# Patient Record
Sex: MALE | Race: WHITE | Employment: OTHER | URBAN - NONMETROPOLITAN AREA
[De-identification: names, ages, dates, MRNs, and addresses within clinical notes are randomized per-mention and may not be internally consistent; named-entity substitution may affect disease eponyms.]

---

## 2018-05-30 ENCOUNTER — TELEPHONE (OUTPATIENT)
Dept: AUDIOLOGY | Age: 73
End: 2018-05-30

## 2018-06-05 ENCOUNTER — TELEPHONE (OUTPATIENT)
Dept: AUDIOLOGY | Age: 73
End: 2018-06-05

## 2018-06-05 ENCOUNTER — HOSPITAL ENCOUNTER (OUTPATIENT)
Dept: AUDIOLOGY | Age: 73
Discharge: HOME | End: 2018-06-05
Payer: MEDICARE

## 2018-06-05 PROCEDURE — V5014 HEARING AID REPAIR/MODIFYING: HCPCS | Performed by: AUDIOLOGIST

## 2018-09-06 ENCOUNTER — TELEPHONE (OUTPATIENT)
Dept: AUDIOLOGY | Age: 73
End: 2018-09-06

## 2018-09-06 NOTE — TELEPHONE ENCOUNTER
Pt stopped by audiology department- the right hearing aid is not working. A listening check confirmed. The hearing aid microphone and  were clean. Sent in for repair (in warranty). Will call the patient at [phone] when the hearing aid comes in.

## 2018-09-12 ENCOUNTER — HOSPITAL ENCOUNTER (INPATIENT)
Age: 73
LOS: 3 days | Discharge: HOME | DRG: 378 | End: 2018-09-15
Attending: HOSPITALIST | Admitting: HOSPITALIST
Payer: MEDICARE

## 2018-09-12 ENCOUNTER — APPOINTMENT (OUTPATIENT)
Dept: CT IMAGING | Age: 73
DRG: 378 | End: 2018-09-12
Payer: MEDICARE

## 2018-09-12 DIAGNOSIS — K92.2 LOWER GI BLEED: Primary | ICD-10-CM

## 2018-09-12 DIAGNOSIS — K57.91 GASTROINTESTINAL HEMORRHAGE ASSOCIATED WITH INTESTINAL DIVERTICULOSIS: ICD-10-CM

## 2018-09-12 LAB
ABO: NORMAL
ALBUMIN SERPL-MCNC: 4.2 G/DL (ref 3.5–5.1)
ALP BLD-CCNC: 82 U/L (ref 38–126)
ALT SERPL-CCNC: 15 U/L (ref 11–66)
ANION GAP SERPL CALCULATED.3IONS-SCNC: 12 MEQ/L (ref 8–16)
ANTIBODY SCREEN: NORMAL
APTT: 27.9 SECONDS (ref 22–38)
AST SERPL-CCNC: 22 U/L (ref 5–40)
BACTERIA: ABNORMAL /HPF
BASOPHILS # BLD: 0.6 %
BASOPHILS ABSOLUTE: 0 THOU/MM3 (ref 0–0.1)
BILIRUB SERPL-MCNC: 0.9 MG/DL (ref 0.3–1.2)
BILIRUBIN DIRECT: < 0.2 MG/DL (ref 0–0.3)
BILIRUBIN URINE: ABNORMAL
BLOOD, URINE: NEGATIVE
BUN BLDV-MCNC: 18 MG/DL (ref 7–22)
CALCIUM SERPL-MCNC: 9.2 MG/DL (ref 8.5–10.5)
CASTS 2: ABNORMAL /LPF
CASTS UA: ABNORMAL /LPF
CHARACTER, URINE: CLEAR
CHLORIDE BLD-SCNC: 104 MEQ/L (ref 98–111)
CO2: 24 MEQ/L (ref 23–33)
COLOR: ABNORMAL
CREAT SERPL-MCNC: 1 MG/DL (ref 0.4–1.2)
CRYSTALS, UA: ABNORMAL
EOSINOPHIL # BLD: 1.8 %
EOSINOPHILS ABSOLUTE: 0.1 THOU/MM3 (ref 0–0.4)
EPITHELIAL CELLS, UA: ABNORMAL /HPF
ERYTHROCYTE [DISTWIDTH] IN BLOOD BY AUTOMATED COUNT: 16.3 % (ref 11.5–14.5)
ERYTHROCYTE [DISTWIDTH] IN BLOOD BY AUTOMATED COUNT: 53.7 FL (ref 35–45)
GFR SERPL CREATININE-BSD FRML MDRD: 73 ML/MIN/1.73M2
GLUCOSE BLD-MCNC: 100 MG/DL (ref 70–108)
GLUCOSE URINE: NEGATIVE MG/DL
HCT VFR BLD CALC: 40 % (ref 42–52)
HEMOCCULT STL QL: POSITIVE
HEMOGLOBIN: 12.9 GM/DL (ref 14–18)
ICTOTEST: NEGATIVE
IMMATURE GRANS (ABS): 0.02 THOU/MM3 (ref 0–0.07)
IMMATURE GRANULOCYTES: 0.3 %
INR BLD: 0.91 (ref 0.85–1.13)
KETONES, URINE: ABNORMAL
LEUKOCYTE ESTERASE, URINE: NEGATIVE
LIPASE: 18.5 U/L (ref 5.6–51.3)
LYMPHOCYTES # BLD: 29 %
LYMPHOCYTES ABSOLUTE: 1.8 THOU/MM3 (ref 1–4.8)
MCH RBC QN AUTO: 29.1 PG (ref 26–33)
MCHC RBC AUTO-ENTMCNC: 32.3 GM/DL (ref 32.2–35.5)
MCV RBC AUTO: 90.1 FL (ref 80–94)
MISCELLANEOUS 2: ABNORMAL
MONOCYTES # BLD: 9.7 %
MONOCYTES ABSOLUTE: 0.6 THOU/MM3 (ref 0.4–1.3)
NITRITE, URINE: NEGATIVE
NUCLEATED RED BLOOD CELLS: 0 /100 WBC
OSMOLALITY CALCULATION: 281.4 MOSMOL/KG (ref 275–300)
PH UA: 5
PLATELET # BLD: 233 THOU/MM3 (ref 130–400)
PMV BLD AUTO: 9.9 FL (ref 9.4–12.4)
POTASSIUM SERPL-SCNC: 4.5 MEQ/L (ref 3.5–5.2)
PROTEIN UA: NEGATIVE
RBC # BLD: 4.44 MILL/MM3 (ref 4.7–6.1)
RBC URINE: ABNORMAL /HPF
RENAL EPITHELIAL, UA: ABNORMAL
RH FACTOR: NORMAL
SEG NEUTROPHILS: 58.6 %
SEGMENTED NEUTROPHILS ABSOLUTE COUNT: 3.6 THOU/MM3 (ref 1.8–7.7)
SODIUM BLD-SCNC: 140 MEQ/L (ref 135–145)
SPECIFIC GRAVITY, URINE: 1.03 (ref 1–1.03)
TOTAL PROTEIN: 6.5 G/DL (ref 6.1–8)
UROBILINOGEN, URINE: 1 EU/DL
WBC # BLD: 6.2 THOU/MM3 (ref 4.8–10.8)
WBC UA: ABNORMAL /HPF
YEAST: ABNORMAL

## 2018-09-12 PROCEDURE — 99222 1ST HOSP IP/OBS MODERATE 55: CPT | Performed by: HOSPITALIST

## 2018-09-12 PROCEDURE — 80053 COMPREHEN METABOLIC PANEL: CPT

## 2018-09-12 PROCEDURE — 2709999900 HC NON-CHARGEABLE SUPPLY

## 2018-09-12 PROCEDURE — 1200000000 HC SEMI PRIVATE

## 2018-09-12 PROCEDURE — 82272 OCCULT BLD FECES 1-3 TESTS: CPT

## 2018-09-12 PROCEDURE — 74177 CT ABD & PELVIS W/CONTRAST: CPT

## 2018-09-12 PROCEDURE — 81001 URINALYSIS AUTO W/SCOPE: CPT

## 2018-09-12 PROCEDURE — 6360000002 HC RX W HCPCS: Performed by: PHYSICIAN ASSISTANT

## 2018-09-12 PROCEDURE — 86923 COMPATIBILITY TEST ELECTRIC: CPT

## 2018-09-12 PROCEDURE — 85025 COMPLETE CBC W/AUTO DIFF WBC: CPT

## 2018-09-12 PROCEDURE — 85730 THROMBOPLASTIN TIME PARTIAL: CPT

## 2018-09-12 PROCEDURE — 82248 BILIRUBIN DIRECT: CPT

## 2018-09-12 PROCEDURE — 2580000003 HC RX 258: Performed by: PHYSICIAN ASSISTANT

## 2018-09-12 PROCEDURE — C9113 INJ PANTOPRAZOLE SODIUM, VIA: HCPCS | Performed by: PHYSICIAN ASSISTANT

## 2018-09-12 PROCEDURE — 83690 ASSAY OF LIPASE: CPT

## 2018-09-12 PROCEDURE — 96365 THER/PROPH/DIAG IV INF INIT: CPT

## 2018-09-12 PROCEDURE — 2580000003 HC RX 258: Performed by: HOSPITALIST

## 2018-09-12 PROCEDURE — 36415 COLL VENOUS BLD VENIPUNCTURE: CPT

## 2018-09-12 PROCEDURE — 6360000004 HC RX CONTRAST MEDICATION: Performed by: PHYSICIAN ASSISTANT

## 2018-09-12 PROCEDURE — 86900 BLOOD TYPING SEROLOGIC ABO: CPT

## 2018-09-12 PROCEDURE — P9016 RBC LEUKOCYTES REDUCED: HCPCS

## 2018-09-12 PROCEDURE — 85610 PROTHROMBIN TIME: CPT

## 2018-09-12 PROCEDURE — 86850 RBC ANTIBODY SCREEN: CPT

## 2018-09-12 PROCEDURE — 99285 EMERGENCY DEPT VISIT HI MDM: CPT

## 2018-09-12 PROCEDURE — 86901 BLOOD TYPING SEROLOGIC RH(D): CPT

## 2018-09-12 RX ORDER — SODIUM CHLORIDE 9 MG/ML
INJECTION, SOLUTION INTRAVENOUS CONTINUOUS
Status: DISCONTINUED | OUTPATIENT
Start: 2018-09-12 | End: 2018-09-14

## 2018-09-12 RX ORDER — SODIUM CHLORIDE 0.9 % (FLUSH) 0.9 %
10 SYRINGE (ML) INJECTION PRN
Status: DISCONTINUED | OUTPATIENT
Start: 2018-09-12 | End: 2018-09-15 | Stop reason: HOSPADM

## 2018-09-12 RX ORDER — 0.9 % SODIUM CHLORIDE 0.9 %
1000 INTRAVENOUS SOLUTION INTRAVENOUS ONCE
Status: COMPLETED | OUTPATIENT
Start: 2018-09-12 | End: 2018-09-12

## 2018-09-12 RX ORDER — SODIUM CHLORIDE 0.9 % (FLUSH) 0.9 %
10 SYRINGE (ML) INJECTION EVERY 12 HOURS SCHEDULED
Status: DISCONTINUED | OUTPATIENT
Start: 2018-09-12 | End: 2018-09-15 | Stop reason: HOSPADM

## 2018-09-12 RX ORDER — ONDANSETRON 2 MG/ML
4 INJECTION INTRAMUSCULAR; INTRAVENOUS EVERY 6 HOURS PRN
Status: DISCONTINUED | OUTPATIENT
Start: 2018-09-12 | End: 2018-09-15 | Stop reason: HOSPADM

## 2018-09-12 RX ORDER — ACETAMINOPHEN 325 MG/1
650 TABLET ORAL EVERY 4 HOURS PRN
Status: DISCONTINUED | OUTPATIENT
Start: 2018-09-12 | End: 2018-09-15 | Stop reason: HOSPADM

## 2018-09-12 RX ADMIN — IOPAMIDOL 80 ML: 755 INJECTION, SOLUTION INTRAVENOUS at 17:02

## 2018-09-12 RX ADMIN — SODIUM CHLORIDE 1000 ML: 9 INJECTION, SOLUTION INTRAVENOUS at 16:20

## 2018-09-12 RX ADMIN — SODIUM CHLORIDE: 9 INJECTION, SOLUTION INTRAVENOUS at 20:45

## 2018-09-12 RX ADMIN — SODIUM CHLORIDE 80 MG: 9 INJECTION, SOLUTION INTRAVENOUS at 17:36

## 2018-09-12 RX ADMIN — SODIUM CHLORIDE 8 MG/HR: 9 INJECTION, SOLUTION INTRAVENOUS at 17:57

## 2018-09-12 ASSESSMENT — ENCOUNTER SYMPTOMS
SORE THROAT: 0
EYE REDNESS: 0
RHINORRHEA: 0
CONSTIPATION: 0
BLOOD IN STOOL: 1
ABDOMINAL PAIN: 0
DIARRHEA: 0
NAUSEA: 0
EYE DISCHARGE: 0
VOMITING: 0
BACK PAIN: 0
WHEEZING: 0
COUGH: 0
SHORTNESS OF BREATH: 0
COLOR CHANGE: 0

## 2018-09-12 ASSESSMENT — PAIN SCALES - GENERAL: PAINLEVEL_OUTOF10: 0

## 2018-09-12 NOTE — H&P
input(s): Lucretia Hermes in the last 72 hours. Urinalysis:      Lab Results   Component Value Date    NITRU NEGATIVE 09/12/2018    WBCUA 0-2 09/12/2018    BACTERIA NONE 09/12/2018    RBCUA 0-2 09/12/2018    BLOODU NEGATIVE 09/12/2018    GLUCOSEU NEGATIVE 09/12/2018       Intake & Output:  No intake/output data recorded. No intake/output data recorded. Radiology:   CT ABDOMEN PELVIS W IV CONTRAST Additional Contrast? None   Final Result      1. Moderate-sized hiatal hernia. 2. Distended gallbladder with possible gallbladder fold or stone in the gallbladder neck. Correlation with serology and ultrasound as clinically indicated. 3. Moderate scattered stool throughout the colon multiple colonic diverticuli greatest sigmoid colon. There is no wall thickening to suggest diverticulitis. 4. Advanced degenerative changes lumbar spine please see above additional comments         **This report has been created using voice recognition software. It may contain minor errors which are inherent in voice recognition technology. **      Final report electronically signed by Dr. Mariann Andino on 9/12/2018 5:18 PM               DVT prophylaxis: [] Lovenox                                 [x] SCDs                                 [] SQ Heparin                                 [] Encourage ambulation           [] Already on Anticoagulation    Code Status: Prior        Disposition:    [x] Home       [] TCU       [] Rehab       [] Psych       [] SNF       [] [facility]       [] Other-    ASSESSMENT:    C/Lucila Tijerina 110[address] Problems    Diagnosis Date Noted    GIB (gastrointestinal bleeding) [K92.2] 09/12/2018       PLAN:    1. GIB  -Will keep NPO, IVF's with NS at 150 mL/hr  -PPI gtt  -CBC in am  -GI consult for further evaluation          Thank you Tonia Rose MD for the opportunity to be involved in this patient's care.     Electronically signed by Sarai Smith MD on 9/12/2018 at 6:48 PM

## 2018-09-12 NOTE — ED PROVIDER NOTES
[facility] EMERGENCY DEPT      CHIEF COMPLAINT       Chief Complaint   Patient presents with    Rectal Bleeding       Nurses Notes reviewed and I agree except as noted in the HPI. HISTORY OF PRESENT ILLNESS    Maricarmen Figueroa is a 74 y.o. male who presents to the emergency department for evaluation of rectal bleeding. The patient reports a history of diverticulitis, lower GI bleeding, and acute blood loss anemia. He states that he does not regularly follow with the gastroenterologist.  The patient states that he noticed blood in his stool today at 1300 and 1400. He denies any associated abdominal pain, nausea, vomiting, diarrhea, or constipation. He reports mild lightheadedness but denies any headaches or dizziness. He denies any chest pain, shortness of breath, fevers, or chills. There are no additional complaints at this time. REVIEW OF SYSTEMS     Review of Systems   Constitutional: Negative for chills, fatigue and fever. HENT: Negative for congestion, ear pain, rhinorrhea and sore throat. Eyes: Negative for discharge and redness. Respiratory: Negative for cough, shortness of breath and wheezing. Cardiovascular: Negative for chest pain and palpitations. Gastrointestinal: Positive for blood in stool. Negative for abdominal pain, constipation, diarrhea, nausea and vomiting. Genitourinary: Negative for decreased urine volume, difficulty urinating and dysuria. Musculoskeletal: Negative for arthralgias, back pain, myalgias, neck pain and neck stiffness. Skin: Negative for color change, pallor and rash. Neurological: Positive for light-headedness. Negative for dizziness, syncope, weakness, numbness and headaches. Hematological: Negative for adenopathy. Psychiatric/Behavioral: Negative for agitation, confusion, dysphoric mood and suicidal ideas. The patient is not nervous/anxious. PAST MEDICAL HISTORY    has a past medical history of Arthritis.     SURGICAL HISTORY      has a past surgical history that includes hernia repair (X3). CURRENT MEDICATIONS       Previous Medications    ACETAMINOPHEN (TYLENOL) 500 MG TABLET    Take 1,000 mg by mouth every 6 hours as needed for Pain    AMLODIPINE (NORVASC) 10 MG TABLET    Take 1 tablet by mouth daily    FERROUS SULFATE 325 (65 FE) MG TABLET    Take 1 tablet by mouth 2 times daily (with meals)    MISC NATURAL PRODUCTS (GLUCOS-CHONDROIT-MSM COMPLEX) TABS    Take 1 tablet by mouth daily    MULTIPLE VITAMINS-MINERALS (THERAPEUTIC MULTIVITAMIN-MINERALS) TABLET    Take 1 tablet by mouth daily       ALLERGIES     is allergic to sulfa antibiotics. FAMILY HISTORY     indicated that his mother is . He indicated that his father is . He indicated that his sister is alive. family history includes Arthritis in his mother and sister; Cancer in his father; High Blood Pressure in his sister. SOCIAL HISTORY      reports that he has been smoking. He has a 7.50 pack-year smoking history. He uses smokeless tobacco. He reports that he does not drink alcohol or use drugs. PHYSICAL EXAM     INITIAL VITALS:  height is 5' 6\" (1.676 m) and weight is 170 lb (77.1 kg). His oral temperature is 98 °F (36.7 °C). His blood pressure is 134/75 and his pulse is 85. His respiration is 16 and oxygen saturation is 95%. Physical Exam   Constitutional: He is oriented to person, place, and time. He appears well-developed and well-nourished. No distress. HENT:   Head: Normocephalic and atraumatic. Right Ear: External ear normal.   Left Ear: External ear normal.   Nose: Nose normal.   Mouth/Throat: Oropharynx is clear and moist.   Eyes: Pupils are equal, round, and reactive to light. Conjunctivae and EOM are normal. Right eye exhibits no discharge. Left eye exhibits no discharge. No scleral icterus. Neck: Normal range of motion. Neck supple. Cardiovascular: Normal rate, regular rhythm and normal heart sounds.   Exam reveals no gallop and no

## 2018-09-12 NOTE — ED NOTES
Pt resting in bed with side rails up 2x2 and call light in reach. Vital signs stable. IV site checked. Orthostatic vitals signs taken. See vitals note.        Tiffanie Mohan RN  09/12/18 3225

## 2018-09-13 ENCOUNTER — APPOINTMENT (OUTPATIENT)
Dept: NUCLEAR MEDICINE | Age: 73
DRG: 378 | End: 2018-09-13
Payer: MEDICARE

## 2018-09-13 LAB
ANION GAP SERPL CALCULATED.3IONS-SCNC: 10 MEQ/L (ref 8–16)
BUN BLDV-MCNC: 15 MG/DL (ref 7–22)
CALCIUM SERPL-MCNC: 7.9 MG/DL (ref 8.5–10.5)
CHLORIDE BLD-SCNC: 109 MEQ/L (ref 98–111)
CO2: 23 MEQ/L (ref 23–33)
CREAT SERPL-MCNC: 0.7 MG/DL (ref 0.4–1.2)
ERYTHROCYTE [DISTWIDTH] IN BLOOD BY AUTOMATED COUNT: 16.3 % (ref 11.5–14.5)
ERYTHROCYTE [DISTWIDTH] IN BLOOD BY AUTOMATED COUNT: 54.7 FL (ref 35–45)
GFR SERPL CREATININE-BSD FRML MDRD: > 90 ML/MIN/1.73M2
GLUCOSE BLD-MCNC: 93 MG/DL (ref 70–108)
HCT VFR BLD CALC: 27.6 % (ref 42–52)
HCT VFR BLD CALC: 28.4 % (ref 42–52)
HCT VFR BLD CALC: 30.8 % (ref 42–52)
HCT VFR BLD CALC: 31.1 % (ref 42–52)
HCT VFR BLD CALC: 33.5 % (ref 42–52)
HEMOGLOBIN: 10.3 GM/DL (ref 14–18)
HEMOGLOBIN: 10.3 GM/DL (ref 14–18)
HEMOGLOBIN: 10.7 GM/DL (ref 14–18)
HEMOGLOBIN: 8.9 GM/DL (ref 14–18)
HEMOGLOBIN: 9.5 GM/DL (ref 14–18)
MCH RBC QN AUTO: 29.6 PG (ref 26–33)
MCHC RBC AUTO-ENTMCNC: 32.2 GM/DL (ref 32.2–35.5)
MCV RBC AUTO: 91.7 FL (ref 80–94)
MRSA SCREEN RT-PCR: NEGATIVE
PLATELET # BLD: 176 THOU/MM3 (ref 130–400)
PMV BLD AUTO: 10 FL (ref 9.4–12.4)
POTASSIUM REFLEX MAGNESIUM: 4.5 MEQ/L (ref 3.5–5.2)
RBC # BLD: 3.01 MILL/MM3 (ref 4.7–6.1)
SODIUM BLD-SCNC: 142 MEQ/L (ref 135–145)
WBC # BLD: 4.4 THOU/MM3 (ref 4.8–10.8)

## 2018-09-13 PROCEDURE — 2709999900 HC NON-CHARGEABLE SUPPLY

## 2018-09-13 PROCEDURE — 85014 HEMATOCRIT: CPT

## 2018-09-13 PROCEDURE — 36415 COLL VENOUS BLD VENIPUNCTURE: CPT

## 2018-09-13 PROCEDURE — 2580000003 HC RX 258: Performed by: HOSPITALIST

## 2018-09-13 PROCEDURE — 80048 BASIC METABOLIC PNL TOTAL CA: CPT

## 2018-09-13 PROCEDURE — 99233 SBSQ HOSP IP/OBS HIGH 50: CPT | Performed by: INTERNAL MEDICINE

## 2018-09-13 PROCEDURE — 2580000003 HC RX 258: Performed by: NURSE PRACTITIONER

## 2018-09-13 PROCEDURE — 85018 HEMOGLOBIN: CPT

## 2018-09-13 PROCEDURE — 6360000002 HC RX W HCPCS: Performed by: HOSPITALIST

## 2018-09-13 PROCEDURE — A9560 TC99M LABELED RBC: HCPCS | Performed by: NURSE PRACTITIONER

## 2018-09-13 PROCEDURE — 87147 CULTURE TYPE IMMUNOLOGIC: CPT

## 2018-09-13 PROCEDURE — 87641 MR-STAPH DNA AMP PROBE: CPT

## 2018-09-13 PROCEDURE — 36430 TRANSFUSION BLD/BLD COMPNT: CPT

## 2018-09-13 PROCEDURE — 85027 COMPLETE CBC AUTOMATED: CPT

## 2018-09-13 PROCEDURE — 2060000000 HC ICU INTERMEDIATE R&B

## 2018-09-13 PROCEDURE — 3430000000 HC RX DIAGNOSTIC RADIOPHARMACEUTICAL: Performed by: NURSE PRACTITIONER

## 2018-09-13 PROCEDURE — 87081 CULTURE SCREEN ONLY: CPT

## 2018-09-13 PROCEDURE — P9016 RBC LEUKOCYTES REDUCED: HCPCS

## 2018-09-13 PROCEDURE — 78278 ACUTE GI BLOOD LOSS IMAGING: CPT

## 2018-09-13 PROCEDURE — C9113 INJ PANTOPRAZOLE SODIUM, VIA: HCPCS | Performed by: HOSPITALIST

## 2018-09-13 RX ORDER — 0.9 % SODIUM CHLORIDE 0.9 %
250 INTRAVENOUS SOLUTION INTRAVENOUS ONCE
Status: DISCONTINUED | OUTPATIENT
Start: 2018-09-13 | End: 2018-09-15 | Stop reason: HOSPADM

## 2018-09-13 RX ORDER — 0.9 % SODIUM CHLORIDE 0.9 %
250 INTRAVENOUS SOLUTION INTRAVENOUS ONCE
Status: COMPLETED | OUTPATIENT
Start: 2018-09-13 | End: 2018-09-14

## 2018-09-13 RX ADMIN — SODIUM CHLORIDE: 9 INJECTION, SOLUTION INTRAVENOUS at 14:10

## 2018-09-13 RX ADMIN — SODIUM CHLORIDE: 9 INJECTION, SOLUTION INTRAVENOUS at 20:07

## 2018-09-13 RX ADMIN — SODIUM CHLORIDE 8 MG/HR: 9 INJECTION, SOLUTION INTRAVENOUS at 20:05

## 2018-09-13 RX ADMIN — SODIUM CHLORIDE: 9 INJECTION, SOLUTION INTRAVENOUS at 04:45

## 2018-09-13 RX ADMIN — SODIUM CHLORIDE 250 ML: 9 INJECTION, SOLUTION INTRAVENOUS at 11:57

## 2018-09-13 RX ADMIN — SODIUM CHLORIDE 250 ML: 9 INJECTION, SOLUTION INTRAVENOUS at 11:14

## 2018-09-13 RX ADMIN — Medication 30.1 MILLICURIE: at 08:06

## 2018-09-13 RX ADMIN — Medication 10 ML: at 14:00

## 2018-09-13 ASSESSMENT — PAIN SCALES - GENERAL
PAINLEVEL_OUTOF10: 0
PAINLEVEL_OUTOF10: 0

## 2018-09-13 NOTE — PLAN OF CARE
Problem: GI  Goal: No bowel complications  Outcome: Ongoing  Patient having LARGE bloody bowel movements, MDs aware  Goal: Bowel movement at least every other day  Outcome: Completed Date Met: 09/13/18      Problem: Nutrition  Goal: Optimal nutrition therapy  Outcome: Ongoing  NPO    Problem: DISCHARGE BARRIERS  Goal: Patient's continuum of care needs are met  Outcome: Ongoing  Home with wife    Problem: Discharge Planning:  Goal: Discharged to appropriate level of care  Discharged to appropriate level of care  Outcome: Ongoing  Home with wife    Problem: Bowel Function - Altered:  Goal: Bowel elimination is within specified parameters  Bowel elimination is within specified parameters  Outcome: Ongoing  Patient having LARGE bloody bowel movements, MDs aware  Gave 3 units of PRBC    Problem: Fluid Volume - Imbalance:  Goal: Will show no signs and symptoms of excessive bleeding  Will show no signs and symptoms of excessive bleeding  Outcome: Ongoing  Having large bloody bowel movements   Goal: Absence of imbalanced fluid volume signs and symptoms  Absence of imbalanced fluid volume signs and symptoms  Outcome: Ongoing  Monitoring inputs and outputs    Problem: Nausea/Vomiting:  Goal: Absence of nausea/vomiting  Absence of nausea/vomiting  Outcome: Ongoing  No nausea or vomiting    Comments: Care plan reviewed with patient and family. Patient and family verbalize understanding of the plan of care and contribute to goal setting.

## 2018-09-13 NOTE — PROGRESS NOTES
1015: Pt arrived in 4k 19 in a wheelchair. Complaints: None. IV normal saline infusing into the antecubital left, condition patent and no redness at a rate of 150 mls/ hour with about 500 mls remaining in the bag. 1015: Patient had one large loose bright red bowel movement    1030: RN updated Sarah Nurse Practitioner, of bleeding scan results a     1100: Sarah nurse practitioner, on floor, ordered RN to give 2 units of PRBC at 999 mls/hour and then ok to give 3rd unit of PRBC slower and to check hgb 1 hour after blood is finished    1145: Called NATALIE Smith, to let her know patient had another large loose red bowel movement. NP ordered okay to give 3rd unit of blood over 999 mls/hour. 1230: Lungs remain clear, patient alert and oriented, vitals okay.

## 2018-09-13 NOTE — CARE COORDINATION
18, 1:03 PM      Hui Rivera       Admitted from: ED to [facility] prior to [facility] 2018/ [address] day:    Location: [facility] Reason for admit: GIB (gastrointestinal bleeding) [K92.2] Status: IP  Admit order signed?: yes  PMH:  has a past medical history of Arthritis; History of blood transfusion; and Hypertension. Procedure: none  Pertinent abnormal Imagin/13 Nuclear Medicine GI Scan: Active GI Bleeding  Medications:  Scheduled Meds:   sodium chloride  250 mL Intravenous Once    sodium chloride  250 mL Intravenous Once    sodium chloride  250 mL Intravenous Once    sodium chloride flush  10 mL Intravenous 2 times per day     Continuous Infusions:   sodium chloride 150 mL/hr at 18 0445    pantoprozole (PROTONIX) infusion        Pertinent Info/Orders/Treatment Plan:  PPI Gtt continued, Blood Transfusion orders on chart, GI following, H 8.9; monitor  Diet: Diet NPO Effective Now Exceptions are: Ice Chips   DVT Prophylaxis: SCD/s ordered  Smoking status:  reports that he has quit smoking. He has a 7.50 pack-year smoking history.  He has quit using smokeless tobacco.   Influenza Vaccination Screening Completed: n/a  Pneumonia Vaccination Screening Completed: no, just admitted, await nsg screen  PCP: Jojo Wheeler MD  Readmission: none  Readmission Risk Score: 6%    Discharge Planning  Current Residence:  Private Residence  Living Arrangements:  Spouse/Significant Other   Support Systems:  Spouse/Significant Other  Current Services PTA:     Potential Assistance Needed:  N/A  Potential Assistance Purchasing Medications:  No  Does patient want to participate in local refill/ meds to beds program?  N/A  Type of Home Care Services:  None  Patient expects to be discharged to:  Home  Expected Discharge date:  09/15/18  Follow Up Appointment: Best Day/ Time:      Discharge Plan: plans home with spouse     Evaluation: no

## 2018-09-13 NOTE — CONSULTS
Consult History & Physical      Patient:  Hui Rivera  YOB: 1945    MRN: [id number]     Acct: [de-identified]      Chief Complaint:  Rectal bleeding    Date of Service: Pt seen/examined in consultation on 9/13/2018    History Of Present Illness:      74 y.o. male who we are asked to see/evaluate by Shey Romo MD for medical management of Rectal bleeding  Hui Rivera is a 74 y.o. male who has underlying history of suspected diverticular bleed in 2016, severe diverticulosis of the sigmoid colon, internal hemorrhoids, hypertension presents to the emergency department for evaluation of rectal bleeding. [address] [facility] reports onset of bright red blood noted 9/12/2018 around 1 PM.  He reports he had 3-4 large bloody loose stools. Prior to bowel movement he had crampy abdominal type pain which was improved after bowel movement. He denies any fever or chills. He states due to prior history of GI bleed who presented to ER. He states prior to this occasionally he would note scant amounts of blood on the tissue that occurred with a bowel movement however this was not routinely. He denies any constipation or diarrhea. He reports his stools are usually a soft brown color. In regards to upper GI he denies indigestion or heartburn type symptoms. Occasionally he will have description of bloating after eating however this is not routine. He denies any odynophagia, dysphagia, epigastric pain, hematemesis or melena. Patient reports he is had 3 bloody stools overnight. His last stool was approximately 20 minutes ago. He describes this as a moderate to large amount. Initially he thought bleeding was slowing down however, now he feels it is about the same. Rectal exam reveals bright red blood in rectal vault. He also has internal hemorrhoids. He states he takes 2 Aleve every day for arthritic pain.     He reports he was admitted to the hospital a couple years ago and underwent a colonoscopy due to amLODIPine (NORVASC) 10 MG tablet Take 1 tablet by mouth daily 8/22/16  Yes Dannie Castillo MD   Multiple Vitamins-Minerals (THERAPEUTIC MULTIVITAMIN-MINERALS) tablet Take 1 tablet by mouth daily   Yes Historical Provider, MD   acetaminophen (TYLENOL) 500 MG tablet Take 1,000 mg by mouth every 6 hours as needed for Pain   Yes Historical Provider, MD   ferrous sulfate 325 (65 FE) MG tablet Take 1 tablet by mouth 2 times daily (with meals) 8/22/16   Dannie Castillo MD   Misc Natural Products (GLUCOS-CHONDROIT-MSM COMPLEX) TABS Take 1 tablet by mouth daily    Historical Provider, MD       Allergies:  Sulfa antibiotics    Social History:      The patient currently lives     TOBACCO:   reports that he has quit smoking. He has a 7.50 pack-year smoking history. He has quit using smokeless tobacco.  ETOH:   reports that he drinks about 14.4 oz of alcohol per week . Family History:      Reviewed in detail and negative for DM, CAD, Cancer, CVA. Positive as follows:    Family History   Problem Relation Age of Onset    Arthritis Mother     Cancer Father     Arthritis Sister     High Blood Pressure Sister        Diet:  Diet NPO Effective Now Exceptions are: Ice Chips      Review Of Systems    GENERAL:  No fever, chills or weight loss. EYES:  No  blurred vision, double vision, glaucoma, pain on exposure to light. CARDIOVASCULAR:  No chest pain or palpitations. RESPIRATORY:  No dyspnea or cough. GI: See HPI  MUSCULOSKELETAL:  No new painful or swollen joints or myalgias. :   No dysuria or hematuria. SKIN:  No rashes or jaundice. NEUROLOGIC:   No headaches or  seizures,  numbness or tingling of arms, or legs. PSYCH:  No anxiety or depression. ENDOCRINE:   No polyuria or polydipsia. BLOOD:  No anemia, bleeding disorder, blood or blood product transfusion.         PHYSICAL EXAM:  /71   Pulse 87   Temp 98.2 °F (36.8 °C) (Oral)   Resp 16   Ht 5' 6\" (1.676 m)   Wt 167 lb (75.8 kg)

## 2018-09-13 NOTE — PROGRESS NOTES
wall thickening to suggest diverticulitis. 4. Advanced degenerative changes lumbar spine please see above additional comments         **This report has been created using voice recognition software. It may contain minor errors which are inherent in voice recognition technology. **      Final report electronically signed by Dr. Caleb Arcos on 9/12/2018 5:18 PM          Diet: Diet NPO Effective Now Exceptions are: Ice Chips    DVT prophylaxis: [] Lovenox                                 [x] SCDs                                 [] SQ Heparin                                 [] Encourage ambulation           [] Already on Anticoagulation     Disposition:    [x] Home       [] TCU       [] Rehab       [] Psych       [] SNF       [] [facility]       [] Other-    Code Status: Full Code    Assessment/Plan:    Anticipated Discharge in : 2-3 days    Active Hospital Problems    Diagnosis Date Noted    GIB (gastrointestinal bleeding) [K92.2] 09/12/2018     acute blood loss anemia due to GI hemorrhage:has a history of severe diverticulosis of the sigmoid colon. His source of bleed is not clear at this time, however, there is evidence of active bleeding and hepatic flexure of his transverse colon. GI has been console that. So far he seems to be responding appropriately to the PRBC transfusions.  - Maintain large bore IV access  - PPI drip  - GI consult, keep nothing by mouth for now  - trend H&H every 4  - avoid NSAIDs or anticoagulants    Hypertension: we'll hold his antihypertensive in the setting of active GI bleed.     Electronically signed by Rich Vogt MD on 9/13/2018 at 6:00 PM

## 2018-09-13 NOTE — PLAN OF CARE
Problem: GI  Goal: No bowel complications  Outcome: Ongoing  Patient having bloody loose stools. Patient on IV Protonix drip. Problem: Nutrition  Goal: Optimal nutrition therapy  Outcome: Ongoing  Patient NPO at this time. Problem: DISCHARGE BARRIERS  Goal: Patient's continuum of care needs are met  Outcome: Ongoing  Patient plans to return home at discharge. Comments: Care plan reviewed with patient. Patient verbalizes understanding of the plan of care and contribute to goal setting.

## 2018-09-14 LAB
HCT VFR BLD CALC: 29.5 % (ref 42–52)
HCT VFR BLD CALC: 29.5 % (ref 42–52)
HEMOGLOBIN: 9.7 GM/DL (ref 14–18)
HEMOGLOBIN: 9.8 GM/DL (ref 14–18)

## 2018-09-14 PROCEDURE — 6370000000 HC RX 637 (ALT 250 FOR IP): Performed by: INTERNAL MEDICINE

## 2018-09-14 PROCEDURE — 2580000003 HC RX 258: Performed by: HOSPITALIST

## 2018-09-14 PROCEDURE — 99232 SBSQ HOSP IP/OBS MODERATE 35: CPT | Performed by: INTERNAL MEDICINE

## 2018-09-14 PROCEDURE — 2709999900 HC NON-CHARGEABLE SUPPLY

## 2018-09-14 PROCEDURE — 36415 COLL VENOUS BLD VENIPUNCTURE: CPT

## 2018-09-14 PROCEDURE — 85018 HEMOGLOBIN: CPT

## 2018-09-14 PROCEDURE — 85014 HEMATOCRIT: CPT

## 2018-09-14 PROCEDURE — 2060000000 HC ICU INTERMEDIATE R&B

## 2018-09-14 RX ORDER — AMLODIPINE BESYLATE 5 MG/1
5 TABLET ORAL DAILY
Status: DISCONTINUED | OUTPATIENT
Start: 2018-09-14 | End: 2018-09-15 | Stop reason: HOSPADM

## 2018-09-14 RX ADMIN — AMLODIPINE BESYLATE 5 MG: 5 TABLET ORAL at 12:01

## 2018-09-14 RX ADMIN — SODIUM CHLORIDE: 9 INJECTION, SOLUTION INTRAVENOUS at 05:55

## 2018-09-14 RX ADMIN — Medication 10 ML: at 20:54

## 2018-09-14 RX ADMIN — Medication 10 ML: at 12:03

## 2018-09-14 ASSESSMENT — PAIN SCALES - GENERAL
PAINLEVEL_OUTOF10: 0

## 2018-09-14 NOTE — FLOWSHEET NOTE
09/14/18 1330   Provider Notification   Reason for Communication Evaluate; Review case   Provider Name [facility]   Provider Notification Nurse Practitioner   Method of Communication Face to face   Response No new orders   Notification Time 1330   asked for NP to put discharge orders in since patient could possibly discharge tomorrow, NP stated she would put it in note later

## 2018-09-14 NOTE — PROGRESS NOTES
Hospitalist Progress Note    Patient:  Litzy Garcia      Unit/Bed:4K-19/019-A    YOB: 1945    MRN: [id number]       Acct: [de-identified]     PCP: Katie Johns MD    Date of Admission: 9/12/2018    Chief Complaint: rectal bleeding    Hospital Course:  Pt with prior rectal bleed, hbp; presents with above. H/h stable. Presumed diverticular bleed. No passage blood since pm 9.13. Subjective:  Pt has no c/o         Medications:  Reviewed    Infusion Medications   Scheduled Medications    amLODIPine  5 mg Oral Daily    sodium chloride  250 mL Intravenous Once    sodium chloride flush  10 mL Intravenous 2 times per day     PRN Meds: sodium chloride flush, acetaminophen, ondansetron      Intake/Output Summary (Last 24 hours) at 09/14/18 0839  Last data filed at 09/14/18 0604   Gross per 24 hour   Intake          5214.72 ml   Output             1450 ml   Net          3764.72 ml       Diet:  DIET CLEAR LIQUID;    Exam:  BP (!) 149/71   Pulse 79   Temp 98.5 °F (36.9 °C) (Oral)   Resp 18   Ht 5' 6\" (1.676 m)   Wt 166 lb (75.3 kg)   SpO2 95%   BMI 26.79 kg/m²     General appearance: No apparent distress, appears stated age and cooperative. HEENT: Pupils equal, round, and reactive to light. Conjunctivae/corneas clear. Neck: Supple, with full range of motion. No jugular venous distention. Trachea midline. Respiratory:  Normal respiratory effort. Clear to auscultation, bilaterally without Rales/Wheezes/Rhonchi. Cardiovascular: Regular rate and rhythm with normal S1/S2 without murmurs, rubs or gallops. Abdomen: Soft, non-tender, non-distended with normal bowel sounds. Musculoskeletal: passive and active ROM x 4 extremities. Skin: Skin color, texture, turgor normal.  No rashes or lesions. Neurologic:  Neurovascularly intact without any focal sensory/motor deficits.  Cranial nerves: II-XII intact, grossly non-focal.  Psychiatric: Alert and oriented, thought content appropriate, normal insight  Capillary Refill: Brisk,< 3 seconds   Peripheral Pulses: +2 palpable, equal bilaterally       Labs:   Recent Labs      09/12/18   1525  09/13/18   0530   09/13/18   1945  09/13/18   2327  09/14/18   0814   WBC  6.2  4.4*   --    --    --    --    HGB  12.9*  8.9*   < >  10.3*  9.5*  9.8*   HCT  40.0*  27.6*   < >  30.8*  28.4*  29.5*   PLT  233  176   --    --    --    --     < > = values in this interval not displayed. Recent Labs      09/12/18   1525  09/13/18   0530   NA  140  142   K  4.5  4.5   CL  104  109   CO2  24  23   BUN  18  15   CREATININE  1.0  0.7   CALCIUM  9.2  7.9*     Recent Labs      09/12/18   1525   AST  22   ALT  15   BILIDIR  <0.2   BILITOT  0.9   ALKPHOS  82     Recent Labs      09/12/18   1611   INR  0.91     No results for input(s): Clara Grayson in the last 72 hours. Urinalysis:    Lab Results   Component Value Date    NITRU NEGATIVE 09/12/2018    WBCUA 0-2 09/12/2018    BACTERIA NONE 09/12/2018    RBCUA 0-2 09/12/2018    BLOODU NEGATIVE 09/12/2018    GLUCOSEU NEGATIVE 09/12/2018       Radiology:  NM GI BLOOD LOSS   Final Result      Abnormal radiotracer accumulation in the transverse colon at the hepatic flexure consistent with active gastrointestinal bleeding. Final report electronically signed by Dr. Ivy Jean on 9/13/2018 9:57 AM      CT ABDOMEN PELVIS W IV CONTRAST Additional Contrast? None   Final Result      1. Moderate-sized hiatal hernia. 2. Distended gallbladder with possible gallbladder fold or stone in the gallbladder neck. Correlation with serology and ultrasound as clinically indicated. 3. Moderate scattered stool throughout the colon multiple colonic diverticuli greatest sigmoid colon. There is no wall thickening to suggest diverticulitis. 4. Advanced degenerative changes lumbar spine please see above additional comments         **This report has been created using voice recognition software.  It may contain minor errors which are inherent in voice recognition technology. **      Final report electronically signed by Dr. Christina Severe on 9/12/2018 5:18 PM          Diet: DIET CLEAR LIQUID;    DVT prophylaxis: [] Lovenox                                 [] SCDs                                 [] SQ Heparin                                 [x] Encourage ambulation           [] Already on Anticoagulation     Disposition:    [x] Home       [] TCU       [] Rehab       [] Psych       [] SNF       [] [facility]       [] Other-    Code Status: Full Code        Assessment/Plan:    Anticipated Discharge in : [address] Problems    Diagnosis Date Noted    Essential hypertension [I10]     GIB (gastrointestinal bleeding) [K92.2] 09/12/2018       1. Rectal bleed- presumed diverticular- to start clear liquids  2. hbp- will restart home med at lower dose.         Electronically signed by Zechariah Townsend MD on 9/14/2018 at 8:39 AM

## 2018-09-14 NOTE — CARE COORDINATION
9/14/18  10:15 AM  Spoke with patient, plans to return home with wife. Denies need for help at home. Advancing diet to clear liquids. Hgb 9.8. Anticipated discharge tomorrow per Dr. Ila Elise order.

## 2018-09-14 NOTE — PLAN OF CARE
Problem: GI  Goal: No bowel complications  Outcome: Ongoing  Patient has not had any stools for me so far this shift. Will continue to monitor. Problem: Nutrition  Goal: Optimal nutrition therapy  Outcome: Ongoing  Patient is NPO. Will continue to monitor. Problem: DISCHARGE BARRIERS  Goal: Patient's continuum of care needs are met  Outcome: Ongoing  Patient plans to go home with wife at discharge. Will continue to monitor. Problem: Discharge Planning:  Goal: Discharged to appropriate level of care  Discharged to appropriate level of care   Outcome: Ongoing      Problem: Nausea/Vomiting:  Goal: Absence of nausea/vomiting  Absence of nausea/vomiting   Outcome: Met This Shift  Patient has not had any nausea/vomiting. Will continue to monitor. Comments: Care plan reviewed with patient. Patient verbalize understanding of the plan of care and contribute to goal setting.

## 2018-09-15 VITALS
RESPIRATION RATE: 18 BRPM | DIASTOLIC BLOOD PRESSURE: 90 MMHG | WEIGHT: 162.5 LBS | TEMPERATURE: 98 F | SYSTOLIC BLOOD PRESSURE: 148 MMHG | HEART RATE: 75 BPM | HEIGHT: 66 IN | OXYGEN SATURATION: 96 % | BODY MASS INDEX: 26.12 KG/M2

## 2018-09-15 LAB
HCT VFR BLD CALC: 32.2 % (ref 42–52)
HEMOGLOBIN: 10.6 GM/DL (ref 14–18)

## 2018-09-15 PROCEDURE — 85014 HEMATOCRIT: CPT

## 2018-09-15 PROCEDURE — 6370000000 HC RX 637 (ALT 250 FOR IP): Performed by: INTERNAL MEDICINE

## 2018-09-15 PROCEDURE — 99239 HOSP IP/OBS DSCHRG MGMT >30: CPT | Performed by: INTERNAL MEDICINE

## 2018-09-15 PROCEDURE — 36415 COLL VENOUS BLD VENIPUNCTURE: CPT

## 2018-09-15 PROCEDURE — 85018 HEMOGLOBIN: CPT

## 2018-09-15 RX ORDER — PANTOPRAZOLE SODIUM 40 MG/1
40 TABLET, DELAYED RELEASE ORAL DAILY
Qty: 30 TABLET | Refills: 3 | Status: SHIPPED | OUTPATIENT
Start: 2018-09-15 | End: 2020-02-02

## 2018-09-15 RX ADMIN — AMLODIPINE BESYLATE 5 MG: 5 TABLET ORAL at 08:20

## 2018-09-15 ASSESSMENT — PAIN SCALES - GENERAL: PAINLEVEL_OUTOF10: 0

## 2018-09-15 NOTE — PROGRESS NOTES
Discharge teaching and instructions for diagnosis/procedure of rectal bleeding completed with patient using teachback method. AVS reviewed. Printed prescriptions given to patient. Patient voiced understanding regarding prescriptions, follow up appointments, and care of self at home.  Discharged from home to  home with support per family

## 2018-09-15 NOTE — DISCHARGE INSTR - DIET
piece white or rye bread   1 teaspoon margarine or butter 1 teaspoon margarine or butter 1 teaspoon margarine or butter   poached egg or egg substitute banana 1/2 cup applesauce   Coffee or tea, with sugar and nondairy creamer 1/2 cup juice (apple, tomato) Coffee or tea, with sugar and nondairy creamer   Helpful tips   Use breads and grains made from refined flour, pasta, and white rice. Do not use whole grain products. Stay away from seeds, nuts, and raw or dried fruits. Also avoid raisins, berries, and foods that have these things in them. Stay away from fresh fruits and veggies. Some exceptions are lettuce, ripe bananas, cucumber, onions, zucchini, and melons. Stay away from juices with pulp. Remove skin from fruits and veggies before cooking. Stay away from prunes and prune juice. Stay away from dried beans and lentils. Limit milk and milk products to 2 cups daily if they make loose stools worse. Do not drink milk if you are lactose intolerant. If any food makes loose stools worse, stop eating that food and try it again later once you no longer have loose stools.

## 2018-09-16 LAB
MRSA SCREEN: NORMAL
VRE CULTURE: NORMAL

## 2018-09-19 ENCOUNTER — TELEPHONE (OUTPATIENT)
Dept: AUDIOLOGY | Age: 73
End: 2018-09-19

## 2018-09-19 ENCOUNTER — HOSPITAL ENCOUNTER (OUTPATIENT)
Age: 73
Discharge: HOME | End: 2018-09-19
Payer: MEDICARE

## 2018-09-19 ENCOUNTER — HOSPITAL ENCOUNTER (OUTPATIENT)
Dept: AUDIOLOGY | Age: 73
Discharge: HOME | End: 2018-09-19

## 2018-09-19 DIAGNOSIS — K57.91 GASTROINTESTINAL HEMORRHAGE ASSOCIATED WITH INTESTINAL DIVERTICULOSIS: ICD-10-CM

## 2018-09-19 LAB
HCT VFR BLD CALC: 33.1 % (ref 42–52)
HEMOGLOBIN: 10.7 GM/DL (ref 14–18)

## 2018-09-19 PROCEDURE — 85014 HEMATOCRIT: CPT

## 2018-09-19 PROCEDURE — 9990000010 HC NO CHARGE VISIT: Performed by: AUDIOLOGIST

## 2018-09-19 PROCEDURE — 85018 HEMOGLOBIN: CPT

## 2018-09-19 PROCEDURE — 36415 COLL VENOUS BLD VENIPUNCTURE: CPT

## 2018-09-24 ENCOUNTER — HOSPITAL ENCOUNTER (OUTPATIENT)
Age: 73
Discharge: HOME | End: 2018-09-24
Payer: MEDICARE

## 2018-09-24 LAB
BASOPHILS # BLD: 1 %
BASOPHILS ABSOLUTE: 0.1 THOU/MM3 (ref 0–0.1)
EOSINOPHIL # BLD: 2.2 %
EOSINOPHILS ABSOLUTE: 0.1 THOU/MM3 (ref 0–0.4)
ERYTHROCYTE [DISTWIDTH] IN BLOOD BY AUTOMATED COUNT: 16.8 % (ref 11.5–14.5)
ERYTHROCYTE [DISTWIDTH] IN BLOOD BY AUTOMATED COUNT: 56.1 FL (ref 35–45)
HCT VFR BLD CALC: 34 % (ref 42–52)
HEMOGLOBIN: 10.9 GM/DL (ref 14–18)
IMMATURE GRANS (ABS): 0.02 THOU/MM3 (ref 0–0.07)
IMMATURE GRANULOCYTES: 0.3 %
LYMPHOCYTES # BLD: 31.4 %
LYMPHOCYTES ABSOLUTE: 1.9 THOU/MM3 (ref 1–4.8)
MCH RBC QN AUTO: 29.9 PG (ref 26–33)
MCHC RBC AUTO-ENTMCNC: 32.1 GM/DL (ref 32.2–35.5)
MCV RBC AUTO: 93.4 FL (ref 80–94)
MONOCYTES # BLD: 8.2 %
MONOCYTES ABSOLUTE: 0.5 THOU/MM3 (ref 0.4–1.3)
NUCLEATED RED BLOOD CELLS: 0 /100 WBC
PLATELET # BLD: 355 THOU/MM3 (ref 130–400)
PMV BLD AUTO: 9.2 FL (ref 9.4–12.4)
RBC # BLD: 3.64 MILL/MM3 (ref 4.7–6.1)
SEG NEUTROPHILS: 56.9 %
SEGMENTED NEUTROPHILS ABSOLUTE COUNT: 3.4 THOU/MM3 (ref 1.8–7.7)
WBC # BLD: 6 THOU/MM3 (ref 4.8–10.8)

## 2018-09-24 PROCEDURE — 36415 COLL VENOUS BLD VENIPUNCTURE: CPT

## 2018-09-24 PROCEDURE — 85025 COMPLETE CBC W/AUTO DIFF WBC: CPT

## 2018-09-25 ENCOUNTER — TELEPHONE (OUTPATIENT)
Dept: AUDIOLOGY | Age: 73
End: 2018-09-25

## 2018-09-25 NOTE — TELEPHONE ENCOUNTER
Sent hearing aid to [facility]. Could not determine problem. It is dead. Due to it recently being repaired I asked for a courtesy rush. Called patient and explained the situation. He will be called when it comes back in.

## 2018-09-25 NOTE — TELEPHONE ENCOUNTER
Patient stops in and \"throws\" his hearing aid on my desk and states that is doesn't work. The hearing aid only worked for a few days and quit. Please examine hearing aid.

## 2018-10-01 ENCOUNTER — TELEPHONE (OUTPATIENT)
Dept: AUDIOLOGY | Age: 73
End: 2018-10-01

## 2018-10-02 ENCOUNTER — HOSPITAL ENCOUNTER (OUTPATIENT)
Dept: AUDIOLOGY | Age: 73
Discharge: HOME | End: 2018-10-02

## 2018-10-02 PROCEDURE — 9990000010 HC NO CHARGE VISIT: Performed by: AUDIOLOGIST

## 2018-12-04 ENCOUNTER — HOSPITAL ENCOUNTER (OUTPATIENT)
Dept: AUDIOLOGY | Age: 73
Discharge: HOME | End: 2018-12-04

## 2018-12-04 PROCEDURE — 9990000010 HC NO CHARGE VISIT: Performed by: AUDIOLOGIST

## 2018-12-04 NOTE — PROGRESS NOTES
Patient dropped off his left hearing aid. It is dead. HA cleaned and checked with not improvement in function. HA sent to [facility] for repair. N/C under warranty. Patient to be called when it is back. He would like and appointment to have the gain increased.

## 2018-12-11 ENCOUNTER — TELEPHONE (OUTPATIENT)
Dept: AUDIOLOGY | Age: 73
End: 2018-12-11

## 2018-12-11 NOTE — TELEPHONE ENCOUNTER
The faceplate needs repaired on the patient's hearing aid. I call to let him know that he approved the repaired. I also verified that the hearing aid should be back to us 12/13/2018. Magen Leon know that we will call him when it is back but he should be able to pick it up possible Thursday but definitely Friday.

## 2018-12-17 ENCOUNTER — HOSPITAL ENCOUNTER (OUTPATIENT)
Dept: AUDIOLOGY | Age: 73
Discharge: HOME | End: 2018-12-17

## 2018-12-17 ENCOUNTER — TELEPHONE (OUTPATIENT)
Dept: AUDIOLOGY | Age: 73
End: 2018-12-17

## 2018-12-17 PROCEDURE — V5014 HEARING AID REPAIR/MODIFYING: HCPCS | Performed by: AUDIOLOGIST

## 2018-12-17 NOTE — TELEPHONE ENCOUNTER
Patient picked up repaired hearing aid. He paid $70.  He is on your schedule for scanning and charging.

## 2019-04-22 ENCOUNTER — TELEPHONE (OUTPATIENT)
Dept: AUDIOLOGY | Age: 74
End: 2019-04-22

## 2019-04-23 NOTE — TELEPHONE ENCOUNTER
Patient dropped off his left hearing aid. I cleaned the hearing aid and vacuumed the  to remove occluding cerumen. Output was still weak. I sent the hearing aid to [facility]. The patient will be called to pick it up when it comes back in.

## 2019-05-03 ENCOUNTER — TELEPHONE (OUTPATIENT)
Dept: AUDIOLOGY | Age: 74
End: 2019-05-03

## 2019-05-03 NOTE — TELEPHONE ENCOUNTER
Please contact the patient and let him know his hearing aid is back from repair and can be picked up.   N/C

## 2019-05-07 ENCOUNTER — HOSPITAL ENCOUNTER (OUTPATIENT)
Dept: AUDIOLOGY | Age: 74
Discharge: HOME | End: 2019-05-07

## 2019-05-07 PROCEDURE — 9990000010 HC NO CHARGE VISIT: Performed by: AUDIOLOGIST

## 2020-02-02 ENCOUNTER — HOSPITAL ENCOUNTER (OUTPATIENT)
Age: 75
Setting detail: OBSERVATION
Discharge: HOME | End: 2020-02-04
Attending: EMERGENCY MEDICINE | Admitting: INTERNAL MEDICINE
Payer: MEDICARE

## 2020-02-02 PROBLEM — K57.33 DIVERTICULITIS OF COLON WITH BLEEDING: Status: ACTIVE | Noted: 2020-02-02

## 2020-02-02 PROBLEM — K62.5 BRBPR (BRIGHT RED BLOOD PER RECTUM): Status: ACTIVE | Noted: 2020-02-02

## 2020-02-02 LAB
ABO: NORMAL
ALBUMIN SERPL-MCNC: 4 G/DL (ref 3.5–5.1)
ALP BLD-CCNC: 83 U/L (ref 38–126)
ALT SERPL-CCNC: 12 U/L (ref 11–66)
ANION GAP SERPL CALCULATED.3IONS-SCNC: 14 MEQ/L (ref 8–16)
ANTIBODY SCREEN: NORMAL
APTT: 29.7 SECONDS (ref 22–38)
AST SERPL-CCNC: 21 U/L (ref 5–40)
BASOPHILS # BLD: 1 %
BASOPHILS ABSOLUTE: 0.1 THOU/MM3 (ref 0–0.1)
BILIRUB SERPL-MCNC: 0.6 MG/DL (ref 0.3–1.2)
BUN BLDV-MCNC: 17 MG/DL (ref 7–22)
CALCIUM SERPL-MCNC: 9.2 MG/DL (ref 8.5–10.5)
CHLORIDE BLD-SCNC: 101 MEQ/L (ref 98–111)
CO2: 23 MEQ/L (ref 23–33)
CREAT SERPL-MCNC: 0.6 MG/DL (ref 0.4–1.2)
EOSINOPHIL # BLD: 2.8 %
EOSINOPHILS ABSOLUTE: 0.2 THOU/MM3 (ref 0–0.4)
ERYTHROCYTE [DISTWIDTH] IN BLOOD BY AUTOMATED COUNT: 13.8 % (ref 11.5–14.5)
ERYTHROCYTE [DISTWIDTH] IN BLOOD BY AUTOMATED COUNT: 49.8 FL (ref 35–45)
GFR SERPL CREATININE-BSD FRML MDRD: > 90 ML/MIN/1.73M2
GLUCOSE BLD-MCNC: 104 MG/DL (ref 70–108)
HCT VFR BLD CALC: 39.2 % (ref 42–52)
HCT VFR BLD CALC: 39.9 % (ref 42–52)
HCT VFR BLD CALC: 41.2 % (ref 42–52)
HCT VFR BLD CALC: 44.6 % (ref 42–52)
HEMOCCULT STL QL: NORMAL
HEMOGLOBIN: 12.7 GM/DL (ref 14–18)
HEMOGLOBIN: 12.9 GM/DL (ref 14–18)
HEMOGLOBIN: 13.2 GM/DL (ref 14–18)
HEMOGLOBIN: 14.5 GM/DL (ref 14–18)
IMMATURE GRANS (ABS): 0.04 THOU/MM3 (ref 0–0.07)
IMMATURE GRANULOCYTES: 0.6 %
INR BLD: 0.87 (ref 0.85–1.13)
LYMPHOCYTES # BLD: 29.4 %
LYMPHOCYTES ABSOLUTE: 2.1 THOU/MM3 (ref 1–4.8)
MCH RBC QN AUTO: 31.7 PG (ref 26–33)
MCHC RBC AUTO-ENTMCNC: 32.5 GM/DL (ref 32.2–35.5)
MCV RBC AUTO: 97.4 FL (ref 80–94)
MONOCYTES # BLD: 7.6 %
MONOCYTES ABSOLUTE: 0.6 THOU/MM3 (ref 0.4–1.3)
NUCLEATED RED BLOOD CELLS: 0 /100 WBC
OSMOLALITY CALCULATION: 277.5 MOSMOL/KG (ref 275–300)
PLATELET # BLD: 224 THOU/MM3 (ref 130–400)
PMV BLD AUTO: 9.8 FL (ref 9.4–12.4)
POTASSIUM REFLEX MAGNESIUM: 4.3 MEQ/L (ref 3.5–5.2)
RBC # BLD: 4.58 MILL/MM3 (ref 4.7–6.1)
RH FACTOR: NORMAL
SEG NEUTROPHILS: 58.6 %
SEGMENTED NEUTROPHILS ABSOLUTE COUNT: 4.3 THOU/MM3 (ref 1.8–7.7)
SODIUM BLD-SCNC: 138 MEQ/L (ref 135–145)
TOTAL PROTEIN: 6.6 G/DL (ref 6.1–8)
WBC # BLD: 7.3 THOU/MM3 (ref 4.8–10.8)

## 2020-02-02 PROCEDURE — G0378 HOSPITAL OBSERVATION PER HR: HCPCS

## 2020-02-02 PROCEDURE — 6360000002 HC RX W HCPCS: Performed by: EMERGENCY MEDICINE

## 2020-02-02 PROCEDURE — 2580000003 HC RX 258: Performed by: INTERNAL MEDICINE

## 2020-02-02 PROCEDURE — 36415 COLL VENOUS BLD VENIPUNCTURE: CPT

## 2020-02-02 PROCEDURE — 80053 COMPREHEN METABOLIC PANEL: CPT

## 2020-02-02 PROCEDURE — 82272 OCCULT BLD FECES 1-3 TESTS: CPT

## 2020-02-02 PROCEDURE — 94760 N-INVAS EAR/PLS OXIMETRY 1: CPT

## 2020-02-02 PROCEDURE — 85025 COMPLETE CBC W/AUTO DIFF WBC: CPT

## 2020-02-02 PROCEDURE — 85730 THROMBOPLASTIN TIME PARTIAL: CPT

## 2020-02-02 PROCEDURE — 96374 THER/PROPH/DIAG INJ IV PUSH: CPT

## 2020-02-02 PROCEDURE — 86850 RBC ANTIBODY SCREEN: CPT

## 2020-02-02 PROCEDURE — 85018 HEMOGLOBIN: CPT

## 2020-02-02 PROCEDURE — 99220 PR INITIAL OBSERVATION CARE/DAY 70 MINUTES: CPT | Performed by: INTERNAL MEDICINE

## 2020-02-02 PROCEDURE — 2709999900 HC NON-CHARGEABLE SUPPLY

## 2020-02-02 PROCEDURE — 99285 EMERGENCY DEPT VISIT HI MDM: CPT

## 2020-02-02 PROCEDURE — 6370000000 HC RX 637 (ALT 250 FOR IP): Performed by: INTERNAL MEDICINE

## 2020-02-02 PROCEDURE — 86901 BLOOD TYPING SEROLOGIC RH(D): CPT

## 2020-02-02 PROCEDURE — 86900 BLOOD TYPING SEROLOGIC ABO: CPT

## 2020-02-02 PROCEDURE — C9113 INJ PANTOPRAZOLE SODIUM, VIA: HCPCS | Performed by: EMERGENCY MEDICINE

## 2020-02-02 PROCEDURE — 85014 HEMATOCRIT: CPT

## 2020-02-02 PROCEDURE — 85610 PROTHROMBIN TIME: CPT

## 2020-02-02 PROCEDURE — 6370000000 HC RX 637 (ALT 250 FOR IP): Performed by: FAMILY MEDICINE

## 2020-02-02 RX ORDER — ONDANSETRON 2 MG/ML
4 INJECTION INTRAMUSCULAR; INTRAVENOUS EVERY 6 HOURS PRN
Status: DISCONTINUED | OUTPATIENT
Start: 2020-02-02 | End: 2020-02-04 | Stop reason: HOSPADM

## 2020-02-02 RX ORDER — FOLIC ACID 1 MG/1
1 TABLET ORAL DAILY
Status: DISCONTINUED | OUTPATIENT
Start: 2020-02-02 | End: 2020-02-04 | Stop reason: HOSPADM

## 2020-02-02 RX ORDER — MULTIVITAMIN WITH FOLIC ACID 400 MCG
1 TABLET ORAL DAILY
Status: DISCONTINUED | OUTPATIENT
Start: 2020-02-02 | End: 2020-02-04 | Stop reason: HOSPADM

## 2020-02-02 RX ORDER — CALCIUM POLYCARBOPHIL 625 MG 625 MG/1
625 TABLET ORAL DAILY
COMMUNITY

## 2020-02-02 RX ORDER — ACETAMINOPHEN 325 MG/1
650 TABLET ORAL EVERY 4 HOURS PRN
Status: DISCONTINUED | OUTPATIENT
Start: 2020-02-02 | End: 2020-02-04 | Stop reason: HOSPADM

## 2020-02-02 RX ORDER — LORAZEPAM 1 MG/1
1 TABLET ORAL
Status: DISCONTINUED | OUTPATIENT
Start: 2020-02-02 | End: 2020-02-04 | Stop reason: HOSPADM

## 2020-02-02 RX ORDER — NICOTINE 21 MG/24HR
1 PATCH, TRANSDERMAL 24 HOURS TRANSDERMAL DAILY
Status: DISCONTINUED | OUTPATIENT
Start: 2020-02-02 | End: 2020-02-04 | Stop reason: HOSPADM

## 2020-02-02 RX ORDER — SODIUM CHLORIDE 0.9 % (FLUSH) 0.9 %
10 SYRINGE (ML) INJECTION EVERY 12 HOURS SCHEDULED
Status: DISCONTINUED | OUTPATIENT
Start: 2020-02-02 | End: 2020-02-04 | Stop reason: HOSPADM

## 2020-02-02 RX ORDER — LORAZEPAM 2 MG/1
4 TABLET ORAL
Status: DISCONTINUED | OUTPATIENT
Start: 2020-02-02 | End: 2020-02-04 | Stop reason: HOSPADM

## 2020-02-02 RX ORDER — SODIUM CHLORIDE 0.9 % (FLUSH) 0.9 %
10 SYRINGE (ML) INJECTION PRN
Status: DISCONTINUED | OUTPATIENT
Start: 2020-02-02 | End: 2020-02-04 | Stop reason: HOSPADM

## 2020-02-02 RX ORDER — LORAZEPAM 2 MG/ML
3 INJECTION INTRAMUSCULAR
Status: DISCONTINUED | OUTPATIENT
Start: 2020-02-02 | End: 2020-02-04 | Stop reason: HOSPADM

## 2020-02-02 RX ORDER — LORAZEPAM 2 MG/ML
1 INJECTION INTRAMUSCULAR
Status: DISCONTINUED | OUTPATIENT
Start: 2020-02-02 | End: 2020-02-04 | Stop reason: HOSPADM

## 2020-02-02 RX ORDER — LORAZEPAM 2 MG/ML
4 INJECTION INTRAMUSCULAR
Status: DISCONTINUED | OUTPATIENT
Start: 2020-02-02 | End: 2020-02-04 | Stop reason: HOSPADM

## 2020-02-02 RX ORDER — SODIUM CHLORIDE 9 MG/ML
INJECTION, SOLUTION INTRAVENOUS CONTINUOUS
Status: ACTIVE | OUTPATIENT
Start: 2020-02-02 | End: 2020-02-03

## 2020-02-02 RX ORDER — PANTOPRAZOLE SODIUM 40 MG/10ML
40 INJECTION, POWDER, LYOPHILIZED, FOR SOLUTION INTRAVENOUS ONCE
Status: COMPLETED | OUTPATIENT
Start: 2020-02-02 | End: 2020-02-02

## 2020-02-02 RX ORDER — POTASSIUM CHLORIDE 7.45 MG/ML
10 INJECTION INTRAVENOUS PRN
Status: DISCONTINUED | OUTPATIENT
Start: 2020-02-02 | End: 2020-02-04 | Stop reason: HOSPADM

## 2020-02-02 RX ORDER — THIAMINE MONONITRATE (VIT B1) 100 MG
100 TABLET ORAL DAILY
Status: DISCONTINUED | OUTPATIENT
Start: 2020-02-02 | End: 2020-02-04 | Stop reason: HOSPADM

## 2020-02-02 RX ORDER — LORAZEPAM 2 MG/1
2 TABLET ORAL
Status: DISCONTINUED | OUTPATIENT
Start: 2020-02-02 | End: 2020-02-04 | Stop reason: HOSPADM

## 2020-02-02 RX ORDER — LORAZEPAM 2 MG/ML
2 INJECTION INTRAMUSCULAR
Status: DISCONTINUED | OUTPATIENT
Start: 2020-02-02 | End: 2020-02-04 | Stop reason: HOSPADM

## 2020-02-02 RX ORDER — AMLODIPINE BESYLATE 10 MG/1
10 TABLET ORAL DAILY
Status: DISCONTINUED | OUTPATIENT
Start: 2020-02-02 | End: 2020-02-04 | Stop reason: HOSPADM

## 2020-02-02 RX ADMIN — PANTOPRAZOLE SODIUM 40 MG: 40 INJECTION, POWDER, FOR SOLUTION INTRAVENOUS at 04:38

## 2020-02-02 RX ADMIN — FOLIC ACID 1 MG: 1 TABLET ORAL at 21:19

## 2020-02-02 RX ADMIN — Medication 10 ML: at 10:30

## 2020-02-02 RX ADMIN — Medication 100 MG: at 21:19

## 2020-02-02 RX ADMIN — THERA TABS 1 TABLET: TAB at 21:19

## 2020-02-02 RX ADMIN — SODIUM CHLORIDE: 9 INJECTION, SOLUTION INTRAVENOUS at 10:31

## 2020-02-02 RX ADMIN — AMLODIPINE BESYLATE 10 MG: 10 TABLET ORAL at 10:32

## 2020-02-02 ASSESSMENT — ENCOUNTER SYMPTOMS
BLOOD IN STOOL: 1
CHEST TIGHTNESS: 0
BACK PAIN: 0
RHINORRHEA: 0
WHEEZING: 0
DIARRHEA: 0
EYE REDNESS: 0
EYE PAIN: 0
COUGH: 0
VOMITING: 0
ABDOMINAL PAIN: 0
ABDOMINAL DISTENTION: 0
EYE ITCHING: 0
EYE DISCHARGE: 0
PHOTOPHOBIA: 0
NAUSEA: 0
CONSTIPATION: 0
SHORTNESS OF BREATH: 0
SORE THROAT: 0
STRIDOR: 0

## 2020-02-02 ASSESSMENT — PAIN SCALES - GENERAL
PAINLEVEL_OUTOF10: 0
PAINLEVEL_OUTOF10: 0

## 2020-02-02 NOTE — ED TRIAGE NOTES
Pt to ED from home with complaints of GI bleeding since midnight tonight. Pt states he has had a history of GI bleeding and diverticulitis. Pt states stools are dark red in nature. Pt states no pain at this time.

## 2020-02-02 NOTE — PLAN OF CARE
Problem: Bowel Function - Altered:  Goal: Bowel elimination is within specified parameters  Description  Bowel elimination is within specified parameters  Outcome: Ongoing  Patient has had 2 stools with blood and clots noted. Will continue to monitor. Problem: Fluid Volume - Imbalance:  Goal: Will show no signs and symptoms of excessive bleeding  Description  Will show no signs and symptoms of excessive bleeding  Outcome: Ongoing   Continuing to monitor labs. No s/s excessive bleeding noted. Problem: Safety:  Goal: Free from accidental physical injury  Description  Free from accidental physical injury  Outcome: Ongoing   Fall assessment completed. Patient using call light appropriately to call for assistance with ambulation to bathroom. Personal items within reach. Patient is also compliant with use of non-skid slippers. Problem: Daily Care:  Goal: Daily care needs are met  Description  Daily care needs are met  Outcome: Ongoing   Patient independent with daily cares. Problem: Discharge Planning:  Goal: Patients continuum of care needs are met  Description  Patients continuum of care needs are met  Outcome: Ongoing   Discharge plan is return home with wife. Care plan reviewed with patient and wife. Patient and wife verbalize understanding of the plan of care and contribute to goal setting.

## 2020-02-02 NOTE — CONSULTS
Laterality Date    HERNIA REPAIR  X3    BILATERAL       Social History:   Social History     Socioeconomic History    Marital status:      Spouse name: Not on file    Number of children: Not on file    Years of education: Not on file    Highest education level: Not on file   Occupational History    Not on file   Social Needs    Financial resource strain: Not on file    Food insecurity:     Worry: Not on file     Inability: Not on file    Transportation needs:     Medical: Not on file     Non-medical: Not on file   Tobacco Use    Smoking status: Current Every Day Smoker     Packs/day: 1.00     Years: 56.00     Pack years: 56.00    Smokeless tobacco: Former User     Types: Chew   Substance and Sexual Activity    Alcohol use: Yes     Alcohol/week: 3.0 standard drinks     Types: 3 Cans of beer per week     Comment: daily    Drug use: No    Sexual activity: Not on file   Lifestyle    Physical activity:     Days per week: Not on file     Minutes per session: Not on file    Stress: Not on file   Relationships    Social connections:     Talks on phone: Not on file     Gets together: Not on file     Attends Mosque service: Not on file     Active member of club or organization: Not on file     Attends meetings of clubs or organizations: Not on file     Relationship status: Not on file    Intimate partner violence:     Fear of current or ex partner: Not on file     Emotionally abused: Not on file     Physically abused: Not on file     Forced sexual activity: Not on file   Other Topics Concern    Not on file   Social History Narrative    Not on file       Family History:   Family History   Problem Relation Age of Onset    Arthritis Mother     Cancer Father     Arthritis Sister     High Blood Pressure Sister        Allergies:  Sulfa antibiotics    Home Meds:  Prior to Admission medications    Medication Sig Start Date End Date Taking?  Authorizing Provider   polycarbophil (FIBERCON) 625 MG tablet Osmolality    Collection Time: 20  3:39 AM   Result Value Ref Range    Osmolality Calc 277.5 275.0 - 300 mOsmol/kg   Hemoglobin and Hematocrit, Blood    Collection Time: 20 12:36 PM   Result Value Ref Range    Hemoglobin 13.2 (L) 14.0 - 18.0 gm/dl    Hematocrit 41.2 (L) 42.0 - 52.0 %       OBJECTIVE:  Physical    VITALS:    Vitals:    20 0823   BP:    Pulse:    Resp:    Temp:    SpO2: 96%    Body mass index is 24.29 kg/m². TEMPERATURE:  Current - Temp: 97.6 °F (36.4 °C); Max - Temp  Av.7 °F (36.5 °C)  Min: 97.6 °F (36.4 °C)  Max: 97.8 °F (36.6 °C)    General:  No acute distress, A&Ox3  Eyes: anicteric sclera, EOMI, PERRLA  Neck: Supple, no JVD, no carotid bruits  Heart: Regular rhythm normal S1 and S2, no rubs, murmurs or gallops  Lungs: CTAB, wheezes, or rhonchi  Abdomen: soft, nondistended, nontender, BS+  Extremities: no clubbing, cyanosis or edema  Neurologic:  No asterixis noted, CN intact grossly, A&Ox3, moving all extremities  Skin: No rashes, no jaundice      Problem List  Principal Problem:    BRBPR (bright red blood per rectum)  Active Problems:    Current smoker    Alcohol abuse    Essential hypertension    Diverticulitis of colon with bleeding  Resolved Problems:    * No resolved hospital problems. *        ASSESSMENT AND PLAN:    Vinicius Segovia is a 66 y.o. male  with HTN, arthritis who we are consulted by Corry Lloyd MD for rectal bleeding. Bleeding likely diverticular however cannot rule out hemorrhoidal vs AVM vs stercoral ulcer. Last colonoscopy per chart review in 2016 this was during hospitalization to evaluate for acute lower GI bleed by Dr. Julita North demonstrated pandiverticulosis was severe diverticulosis in the sigmoid colon, 5 diminutive polyps all removed flexible blood present in the left colon especially the sigmoid colon, a few tiny flecks also noted in the right colon.  There was no definitive source of bleeding however findings believed to be

## 2020-02-02 NOTE — ED NOTES
ED to inpatient nurses report    Chief Complaint   Patient presents with    GI Bleeding      Present to ED from home  LOC: alert and orientated to name, place, date  Vital signs   Vitals:    02/02/20 0328 02/02/20 0335 02/02/20 0441   BP:  (!) 152/89 (!) 143/81   Pulse:  82 67   Resp:  18 14   Temp:  97.8 °F (36.6 °C)    TempSrc:  Oral    SpO2:  98% 96%   Weight: 165 lb (74.8 kg)     Height: 5' 9\" (1.753 m)        Oxygen Baseline room air    Current needs required room air  LDAs:   Peripheral IV 02/02/20 Left Forearm (Active)   Site Assessment Clean;Dry; Intact 2/2/2020  3:39 AM   Line Status Normal saline locked; Blood return noted 2/2/2020  3:39 AM   Dressing Status Clean;Dry; Intact 2/2/2020  3:39 AM   Dressing Intervention New 2/2/2020  3:39 AM     Mobility: Independent  Pending ED orders: none  Present condition: stable, denies pain, hgb within normal limits at this time. Stools are bright red in color.      Electronically signed by Asher Shore RN on 2/2/2020 at 4:43 AM       Asher Shore RN  02/02/20 6408

## 2020-02-02 NOTE — PROGRESS NOTES
Hospitalist Progress Note    Patient:  Rufina Hahn      Unit/Bed:5K-20/020-A    YOB: 1945    MRN: [id number]       Acct: [de-identified]     PCP: Michael Nolan MD    Date of Admission: 2/2/2020    Chief Complaint: rectal bleeding     Hospital Course:     Please see H/P for details. In summary, this is a 66 y.o. male, with past medical history of essential hypertension, diverticular bleed, smoker, history of alcohol abuse (patient admits to drinking 4 beers a day since 23years old, last drink was 2/1/2020 night), who presented to [facility] on 2/2/2020 due to rectal bleeding. Per H&P note, \"Patient was in his usual state of health, and had 3 beers in the evening as usual and was continuing to smoke 1 pack cigarettes per day and doing fairly good until 2:00 this morning he felt cramping in the abdomen and felt the need to go to the bathroom and woke up and had a dark stool with little blood in it. In the next 1 hour again he had another 2 bowel movements and was all blood. Patient had bleeding in September 2018 and in 2016 and was suspected to have diverticular bleeding and came to the emergency room as he was worried about bleeding again. While in the emergency room he had 2 more episodes of small bright red blood per rectum. Denied any other associated symptoms especially nausea, chest pain dizziness lightheadedness. No shortness of breath. \"  Hemoglobin 14.5 on arrival.  Patient was placed on observation under hospital medicine service for acute bright red blood per rectum suspected to be secondary to diverticular bleed. GI was consulted. Patient was seen by GI on 2/2/2020, who recommend to hold off colonoscopy at this time, if diverticular bleed, likely self-limited per GI note. Subjective:     Patient seen and examined. Patient reports that he is feeling fine. Abdominal cramping improved. Per RN, patient had 2 BM.   Blood clots today, close monitoring was uneventful patient. He denies nausea, vomiting, dizziness, shortness of breath, chest pain, palpitations. He is currently on clear liquid diet, tolerating well. Medications:  Reviewed    Infusion Medications    sodium chloride 50 mL/hr at 02/02/20 1031     Scheduled Medications    amLODIPine  10 mg Oral Daily    sodium chloride flush  10 mL Intravenous 2 times per day    nicotine  1 patch Transdermal Daily    thiamine  100 mg Oral Daily    folic acid  1 mg Oral Daily    multivitamin  1 tablet Oral Daily     PRN Meds: sodium chloride flush, ondansetron, potassium chloride, magnesium sulfate, acetaminophen, LORazepam **OR** LORazepam **OR** LORazepam **OR** LORazepam **OR** LORazepam **OR** LORazepam **OR** LORazepam **OR** LORazepam      Intake/Output Summary (Last 24 hours) at 2/2/2020 1655  Last data filed at 2/2/2020 1359  Gross per 24 hour   Intake 633 ml   Output 0 ml   Net 633 ml       Diet:  DIET CLEAR LIQUID;    Exam:  BP (!) 145/95   Pulse 102   Temp 97.7 °F (36.5 °C) (Oral)   Resp 18   Ht 5' 9\" (1.753 m)   Wt 164 lb 8 oz (74.6 kg)   SpO2 94%   BMI 24.29 kg/m²     General appearance: alert, not in acute distress. HEENT: Pupils equal, round, and reactive to light. Conjunctivae clear. Clear oral mucosa. Neck: Supple, with full range of motion. Respiratory:  Normal respiratory effort. Clear to auscultation, bilaterally without Rales/Wheezes/Rhonchi. Cardiovascular: normal rate, regular rhythm with normal S1/S2 without murmurs, rubs or gallops. Abdomen: Soft, non-tender, non-distended with normal bowel sounds. Musculoskeletal: passive and active ROM x 4 extremities.   Exam of extremities: peripheral pulses normal, no pedal edema, no clubbing or cyanosis      Labs:   Recent Labs     02/02/20  0339 02/02/20  1236   WBC 7.3  --    HGB 14.5 13.2*   HCT 44.6 41.2*     --      Recent Labs     02/02/20  0339      K 4.3      CO2 23   BUN 17   CREATININE 0.6   CALCIUM 9.2

## 2020-02-02 NOTE — ED PROVIDER NOTES
[facility] COMPLAINT       Chief Complaint   Patient presents with    GI Bleeding       Nurses Notes reviewed and I agreeexcept as noted in the HPI. HISTORY OF PRESENT ILLNESS    Nolan Heard is a 66 y.o. male who presents to Emergency Department with GI Bleeding    Ed presents to the emergency room because of bright red blood per rectum which happened around midnight (about four hours ago). Patient said he had totally three episodes of BM which were blood. Patient has no abdominal pain. Past medical history remarkable for GI bleeding x 2 (in 2016 and 2018) due to diverticular disease and required transfusion. He has no fever, no chills, he has no chest pain or SOB. No nausea vomiting. No hematemesis. He had one BM in ED which is dark blood. REVIEW OF SYSTEMS     Review of Systems   Constitutional: Negative for activity change, appetite change, chills, fatigue, fever and unexpected weight change. HENT: Negative for congestion, ear discharge, ear pain, hearing loss, nosebleeds, rhinorrhea and sore throat. Eyes: Negative for photophobia, pain, discharge, redness and itching. Respiratory: Negative for cough, chest tightness, shortness of breath, wheezing and stridor. Cardiovascular: Negative for chest pain, palpitations and leg swelling. Gastrointestinal: Positive for blood in stool. Negative for abdominal distention, abdominal pain, constipation, diarrhea, nausea and vomiting. Endocrine: Negative for cold intolerance, heat intolerance, polydipsia and polyphagia. Genitourinary: Negative for dysuria, flank pain, frequency and hematuria. Musculoskeletal: Negative for arthralgias, back pain, gait problem, myalgias, neck pain and neck stiffness. Skin: Negative for pallor, rash and wound. Allergic/Immunologic: Negative for environmental allergies and food allergies.    Neurological: Negative for dizziness, tremors, syncope, weakness and headaches. Psychiatric/Behavioral: Negative for agitation, behavioral problems, confusion, self-injury, sleep disturbance and suicidal ideas. PAST MEDICAL HISTORY    has a past medical history of Arthritis, Diverticular hemorrhage, History of blood transfusion, and Hypertension. SURGICAL HISTORY      has a past surgical history that includes hernia repair (X3). CURRENT MEDICATIONS       Previous Medications    ACETAMINOPHEN (TYLENOL) 500 MG TABLET    Take 1,000 mg by mouth every 6 hours as needed for Pain    AMLODIPINE (NORVASC) 10 MG TABLET    Take 1 tablet by mouth daily    MISC NATURAL PRODUCTS (GLUCOS-CHONDROIT-MSM COMPLEX) TABS    Take 1 tablet by mouth daily    MULTIPLE VITAMINS-MINERALS (THERAPEUTIC MULTIVITAMIN-MINERALS) TABLET    Take 1 tablet by mouth daily    PANTOPRAZOLE (PROTONIX) 40 MG TABLET    Take 1 tablet by mouth daily    POLYCARBOPHIL (FIBERCON) 625 MG TABLET    Take 625 mg by mouth daily       ALLERGIES     is allergic to sulfa antibiotics. FAMILY HISTORY     He indicated that his mother is . He indicated that his father is . He indicated that his sister is alive. family history includes Arthritis in his mother and sister; Cancer in his father; High Blood Pressure in his sister. SOCIAL HISTORY      reports that he has been smoking. He has a 56.00 pack-year smoking history. He has quit using smokeless tobacco.  His smokeless tobacco use included chew. He reports current alcohol use of about 3.0 standard drinks of alcohol per week. He reports that he does not use drugs. PHYSICAL EXAM     INITIAL VITALS:  height is 5' 9\" (1.753 m) and weight is 165 lb (74.8 kg). His oral temperature is 97.8 °F (36.6 °C). His blood pressure is 143/81 (abnormal) and his pulse is 67. His respiration is 14 and oxygen saturation is 96%. Physical Exam  Vitals signs and nursing note reviewed. Constitutional:       Appearance: He is well-developed.  He is not diaphoretic. HENT:      Head: Normocephalic and atraumatic. Nose: Nose normal.   Eyes:      General: No scleral icterus. Right eye: No discharge. Left eye: No discharge. Conjunctiva/sclera: Conjunctivae normal.      Pupils: Pupils are equal, round, and reactive to light. Neck:      Musculoskeletal: Normal range of motion and neck supple. Vascular: No JVD. Trachea: No tracheal deviation. Cardiovascular:      Rate and Rhythm: Normal rate and regular rhythm. Heart sounds: Normal heart sounds. No murmur. No friction rub. No gallop. Pulmonary:      Effort: Pulmonary effort is normal. No respiratory distress. Breath sounds: Normal breath sounds. No stridor. No wheezing or rales. Chest:      Chest wall: No tenderness. Abdominal:      General: Bowel sounds are normal. There is no distension. Palpations: Abdomen is soft. There is no mass. Tenderness: There is no abdominal tenderness. There is no guarding or rebound. Hernia: No hernia is present. Musculoskeletal:         General: No tenderness or deformity. Lymphadenopathy:      Cervical: No cervical adenopathy. Skin:     General: Skin is warm and dry. Capillary Refill: Capillary refill takes less than 2 seconds. Coloration: Skin is not pale. Findings: No erythema or rash. Neurological:      Mental Status: He is alert and oriented to person, place, and time. Cranial Nerves: No cranial nerve deficit. Sensory: No sensory deficit. Motor: No abnormal muscle tone. Coordination: Coordination normal.      Deep Tendon Reflexes: Reflexes normal.   Psychiatric:         Behavior: Behavior normal.         Thought Content:  Thought content normal.         Judgment: Judgment normal.           DIFFERENTIAL DIAGNOSIS:   GI bleeding, anemia, diverticular disease, AV malformation, angiodysplasia    DIAGNOSTIC RESULTS     EKG: All EKG's are interpreted by the Emergency Department Physician who either signs or Co-signsthis chart in the absence of a cardiologist.  Not indicated    RADIOLOGY: non-plain film images(s) such as CT, Ultrasound and MRI are read by the radiologist.    No orders to display       []Visualized and interpreted by me   [] Radiologist's Wet Read Report Reviewed   [] Discussed with Radiologist.    Sean Con:   Results for orders placed or performed during the hospital encounter of 02/02/20   CBC auto differential   Result Value Ref Range    WBC 7.3 4.8 - 10.8 thou/mm3    RBC 4.58 (L) 4.70 - 6.10 mill/mm3    Hemoglobin 14.5 14.0 - 18.0 gm/dl    Hematocrit 44.6 42.0 - 52.0 %    MCV 97.4 (H) 80.0 - 94.0 fL    MCH 31.7 26.0 - 33.0 pg    MCHC 32.5 32.2 - 35.5 gm/dl    RDW-CV 13.8 11.5 - 14.5 %    RDW-SD 49.8 (H) 35.0 - 45.0 fL    Platelets 491 144 - 801 thou/mm3    MPV 9.8 9.4 - 12.4 fL    Seg Neutrophils 58.6 %    Lymphocytes 29.4 %    Monocytes 7.6 %    Eosinophils 2.8 %    Basophils 1.0 %    Immature Granulocytes 0.6 %    Segs Absolute 4.3 1.8 - 7.7 thou/mm3    Lymphocytes Absolute 2.1 1.0 - 4.8 thou/mm3    Monocytes Absolute 0.6 0.4 - 1.3 thou/mm3    Eosinophils Absolute 0.2 0.0 - 0.4 thou/mm3    Basophils Absolute 0.1 0.0 - 0.1 thou/mm3    Immature Grans (Abs) 0.04 0.00 - 0.07 thou/mm3    nRBC 0 /100 wbc   Blood occult stool screen #1   Result Value Ref Range    OCCULT BLOOD FECAL see below    Comprehensive Metabolic Panel w/ Reflex to MG   Result Value Ref Range    Glucose 104 70 - 108 mg/dL    CREATININE 0.6 0.4 - 1.2 mg/dL    BUN 17 7 - 22 mg/dL    Sodium 138 135 - 145 meq/L    Potassium reflex Magnesium 4.3 3.5 - 5.2 meq/L    Chloride 101 98 - 111 meq/L    CO2 23 23 - 33 meq/L    Calcium 9.2 8.5 - 10.5 mg/dL    AST 21 5 - 40 U/L    Alkaline Phosphatase 83 38 - 126 U/L    Total Protein 6.6 6.1 - 8.0 g/dL    Alb 4.0 3.5 - 5.1 g/dL    Total Bilirubin 0.6 0.3 - 1.2 mg/dL    ALT 12 11 - 66 U/L   APTT   Result Value Ref Range    aPTT 29.7 22.0 - 38.0 seconds

## 2020-02-02 NOTE — FLOWSHEET NOTE
Pt is a 74yo man. Pt was not in room, pt was walking the halls with his IV pole attached.  responded to consult for AD.  left forms with pt. Pt's wife is a nurse and pt does want to complete them. Pt requested  return tomorrow after he had a chance to look them over. Spiritual care to continue to offer support and encouragement. 02/02/20 1435   Encounter Summary   Services provided to: Patient   Referral/Consult From: Physician   Support System Spouse; Family members   Continue Visiting Yes  (2/2)   Complexity of Encounter Low   Length of Encounter 15 minutes   Advance Care Planning Yes   Routine   Type Initial   Assessment Calm; Approachable   Intervention Active listening   Outcome Engaged in conversation

## 2020-02-02 NOTE — H&P
tablet Take 1 tablet by mouth daily 8/22/16  Yes Harjit Hernandez MD   Multiple Vitamins-Minerals (THERAPEUTIC MULTIVITAMIN-MINERALS) tablet Take 1 tablet by mouth daily   Yes Historical Provider, MD   Misc Natural Products (GLUCOS-CHONDROIT-MSM COMPLEX) TABS Take 1 tablet by mouth daily   Yes Historical Provider, MD   acetaminophen (TYLENOL) 500 MG tablet Take 1,000 mg by mouth every 6 hours as needed for Pain    Historical Provider, MD       Allergies:  Sulfa antibiotics    Social History:      The patient currently lives  At home    TOBACCO:   reports that he has been smoking. He has a 56.00 pack-year smoking history. He has quit using smokeless tobacco.  His smokeless tobacco use included chew. ETOH:   reports current alcohol use of about 3.0 standard drinks of alcohol per week. Family History:              Problem Relation Age of Onset    Arthritis Mother     Cancer Father     Arthritis Sister     High Blood Pressure Sister        Diet:  No diet orders on file    REVIEW OF SYSTEMS:   Pertinent positives as noted in the HPI. All other systems reviewed and negative.     PHYSICAL EXAM:    BP (!) 143/81   Pulse 67   Temp 97.8 °F (36.6 °C) (Oral)   Resp 14   Ht 5' 9\" (1.753 m)   Wt 165 lb (74.8 kg)   SpO2 96%   BMI 24.37 kg/m²     Physical Examination:   General appearance - alert, awake  and in no distress  HEENT: Normocephalic, Atraumatic, pupils reactive, mucous membranes moist  Chest - moving equally to respiration,symmetric air entry,  Little decreased but clear to auscultation  Heart - normal rate, regular rhythm, normal S1, S2, no murmurs,   Abdomen - soft, nontender, nondistended, no masses or organomegaly, BS+  Neurological - alert, oriented, normal speech, sensations intact and able to move all extremities   Extremities - peripheral pulses normal, no pedal edema,  Capillary refill less than 3 sec  Skin - normal coloration and turgor, no rashes      Labs:     Recent Labs     02/02/20  3644

## 2020-02-03 ENCOUNTER — APPOINTMENT (OUTPATIENT)
Dept: NUCLEAR MEDICINE | Age: 75
End: 2020-02-03
Payer: MEDICARE

## 2020-02-03 LAB
ANION GAP SERPL CALCULATED.3IONS-SCNC: 12 MEQ/L (ref 8–16)
BUN BLDV-MCNC: 11 MG/DL (ref 7–22)
CALCIUM SERPL-MCNC: 8.7 MG/DL (ref 8.5–10.5)
CHLORIDE BLD-SCNC: 105 MEQ/L (ref 98–111)
CO2: 25 MEQ/L (ref 23–33)
CREAT SERPL-MCNC: 0.6 MG/DL (ref 0.4–1.2)
ERYTHROCYTE [DISTWIDTH] IN BLOOD BY AUTOMATED COUNT: 13.8 % (ref 11.5–14.5)
ERYTHROCYTE [DISTWIDTH] IN BLOOD BY AUTOMATED COUNT: 50 FL (ref 35–45)
GFR SERPL CREATININE-BSD FRML MDRD: > 90 ML/MIN/1.73M2
GLUCOSE BLD-MCNC: 125 MG/DL (ref 70–108)
HCT VFR BLD CALC: 38.7 % (ref 42–52)
HCT VFR BLD CALC: 39.2 % (ref 42–52)
HCT VFR BLD CALC: 40.6 % (ref 42–52)
HEMOGLOBIN: 12.6 GM/DL (ref 14–18)
MCH RBC QN AUTO: 31.7 PG (ref 26–33)
MCHC RBC AUTO-ENTMCNC: 32.1 GM/DL (ref 32.2–35.5)
MCV RBC AUTO: 98.7 FL (ref 80–94)
PLATELET # BLD: 204 THOU/MM3 (ref 130–400)
PMV BLD AUTO: 10 FL (ref 9.4–12.4)
POTASSIUM SERPL-SCNC: 4.3 MEQ/L (ref 3.5–5.2)
RBC # BLD: 3.97 MILL/MM3 (ref 4.7–6.1)
SODIUM BLD-SCNC: 142 MEQ/L (ref 135–145)
WBC # BLD: 5.7 THOU/MM3 (ref 4.8–10.8)

## 2020-02-03 PROCEDURE — 2580000003 HC RX 258: Performed by: NURSE PRACTITIONER

## 2020-02-03 PROCEDURE — 6370000000 HC RX 637 (ALT 250 FOR IP): Performed by: NURSE PRACTITIONER

## 2020-02-03 PROCEDURE — 99225 PR SBSQ OBSERVATION CARE/DAY 25 MINUTES: CPT | Performed by: FAMILY MEDICINE

## 2020-02-03 PROCEDURE — 80048 BASIC METABOLIC PNL TOTAL CA: CPT

## 2020-02-03 PROCEDURE — 6370000000 HC RX 637 (ALT 250 FOR IP): Performed by: INTERNAL MEDICINE

## 2020-02-03 PROCEDURE — 36415 COLL VENOUS BLD VENIPUNCTURE: CPT

## 2020-02-03 PROCEDURE — G0378 HOSPITAL OBSERVATION PER HR: HCPCS

## 2020-02-03 PROCEDURE — 3430000000 HC RX DIAGNOSTIC RADIOPHARMACEUTICAL: Performed by: NURSE PRACTITIONER

## 2020-02-03 PROCEDURE — 85014 HEMATOCRIT: CPT

## 2020-02-03 PROCEDURE — 85018 HEMOGLOBIN: CPT

## 2020-02-03 PROCEDURE — 85027 COMPLETE CBC AUTOMATED: CPT

## 2020-02-03 PROCEDURE — 6370000000 HC RX 637 (ALT 250 FOR IP): Performed by: FAMILY MEDICINE

## 2020-02-03 PROCEDURE — A9560 TC99M LABELED RBC: HCPCS | Performed by: NURSE PRACTITIONER

## 2020-02-03 PROCEDURE — 2709999900 HC NON-CHARGEABLE SUPPLY

## 2020-02-03 PROCEDURE — 78278 ACUTE GI BLOOD LOSS IMAGING: CPT

## 2020-02-03 RX ORDER — PANTOPRAZOLE SODIUM 40 MG/1
40 TABLET, DELAYED RELEASE ORAL
Status: DISCONTINUED | OUTPATIENT
Start: 2020-02-03 | End: 2020-02-04 | Stop reason: HOSPADM

## 2020-02-03 RX ORDER — DOCUSATE SODIUM 100 MG/1
100 CAPSULE, LIQUID FILLED ORAL DAILY
Status: DISCONTINUED | OUTPATIENT
Start: 2020-02-03 | End: 2020-02-04 | Stop reason: HOSPADM

## 2020-02-03 RX ORDER — SODIUM CHLORIDE 9 MG/ML
INJECTION, SOLUTION INTRAVENOUS CONTINUOUS
Status: DISCONTINUED | OUTPATIENT
Start: 2020-02-03 | End: 2020-02-04 | Stop reason: HOSPADM

## 2020-02-03 RX ADMIN — DOCUSATE SODIUM 100 MG: 100 CAPSULE, LIQUID FILLED ORAL at 09:56

## 2020-02-03 RX ADMIN — Medication 30 MILLICURIE: at 11:51

## 2020-02-03 RX ADMIN — SODIUM CHLORIDE: 9 INJECTION, SOLUTION INTRAVENOUS at 15:58

## 2020-02-03 RX ADMIN — FOLIC ACID 1 MG: 1 TABLET ORAL at 09:56

## 2020-02-03 RX ADMIN — THERA TABS 1 TABLET: TAB at 09:56

## 2020-02-03 RX ADMIN — Medication 100 MG: at 09:56

## 2020-02-03 RX ADMIN — AMLODIPINE BESYLATE 10 MG: 10 TABLET ORAL at 09:56

## 2020-02-03 RX ADMIN — PANTOPRAZOLE SODIUM 40 MG: 40 TABLET, DELAYED RELEASE ORAL at 09:56

## 2020-02-03 ASSESSMENT — PAIN SCALES - GENERAL: PAINLEVEL_OUTOF10: 0

## 2020-02-03 NOTE — PLAN OF CARE
Problem: Bowel Function - Altered:  Goal: Bowel elimination is within specified parameters  Description  Bowel elimination is within specified parameters  2/2/2020 2350 by Perla Snow RN  Outcome: Ongoing  Note:   Pt has not had a bowel movement this shift. Will continue to monitor. Problem: Safety:  Goal: Free from accidental physical injury  Description  Free from accidental physical injury  2/2/2020 2350 by Perla Snow RN  Outcome: Ongoing  Note:   Pt remains free from falls this shift. Bed exit alarm activated. Bed in lowest position with brakes on.2/4 side rails raised for increased safety. Pt utilizes non-skid footwear and standby assist with ambulation. Pathway clear and possessions within reach. Call light within reach. Pt rounded on hourly. Problem: Discharge Planning:  Goal: Patients continuum of care needs are met  Description  Patients continuum of care needs are met  2/2/2020 2350 by Perla Snow RN  Outcome: Ongoing  Note:   Pt's discharge plan reviewed with pt. Pt is from private residence. Upon discharge, pt plans to return to private residence. Care plan reviewed with patient. Patient verbalize understanding of the plan of care and contribute to goal setting.

## 2020-02-03 NOTE — PROGRESS NOTES
PROGRESS NOTE      Patient:  David Hernandes      Unit/Bed:5K-20/020-A    YOB: 1945    MRN: [id number]       Acct: [de-identified]     PCP: Yahir Loyd MD    Date of Admission: 2/2/2020      Assessment/Plan:    Acute bright red blood per rectum likely 2/2 diverticulosis  History of prior diverticular bleeds in 2016 and 2018   + fecal occult blood  GI consult  No plans for colonoscopy at this time   Conservative management, gently hydration, trend H&H  NM GL blood loss scan today  Protonix daily   Full liquid diet    Acute anemia 2/2 above   Trend H&H q6  INR and aPTT wnl  Transfuse if <8 per GI  Type and screen completed   Still trending slightly downward- 14.5 -> 13.2 -> 12.9 -> 12.7 -> 12.6    HTN  Home norvasc with hold parameters  BP stable     Alcohol abuse  Reports 3 beers daily  Vitamin supplements, folate and thiamine   CIWA scale to monitor   No s/s of withdrawal     Smoker  Nicotine patch    Hx of Diverticular bleed  2016 and 2018      Anticipated Discharge in : 1-2 days    Chief Complaint: [facility] Course:   66 y.o. male who presented to [address] [place] [address] with chief complaint of bright red blood per rectum.     Patient was in his usual state of health, and had 3 beers in the evening as usual and was continuing to smoke 1 pack cigarettes per day and doing fairly good until 2:00 the morning of admission. He felt cramping in the abdomen and felt the need to go to the bathroom and had a dark stool with little blood in it. In the next 1 hour again he had another 2 bowel movements and states it was all blood. Patient had bleeding in September 2018 and in 2016 and was suspected to have diverticular bleeding and came to the emergency room as he was worried about bleeding again.     While in the emergency room he had 2 more episodes of small bright red blood per rectum. Denied any other associated symptoms especially nausea, chest pain dizziness lightheadedness.   No shortness of non-distended with normal bowel sounds. Musculoskeletal: passive and active ROM x 4 extremities. Skin: Skin color, texture, turgor normal.  No rashes or lesions. Neurologic:  Neurovascularintact without any focal sensory/motor deficits. Cranial nerves: II-XII intact, groly ssly non-focal.  Psychiatric: Alert and oriented, thought content appropriate, normal insight  Capillary Refill: Brisk,< 3 seconds   Peripheral Pulses: +2 palpable, equal bilaterally       Labs:   Recent Labs     02/02/20  0339  02/02/20  1741 02/02/20  2325 02/03/20  0709   WBC 7.3  --   --   --  5.7   HGB 14.5   < > 12.9* 12.7* 12.6*   HCT 44.6   < > 39.9* 39.2* 39.2*     --   --   --  204    < > = values in this interval not displayed. Recent Labs     02/02/20  0339 02/03/20  0709    142   K 4.3 4.3    105   CO2 23 25   BUN 17 11   CREATININE 0.6 0.6   CALCIUM 9.2 8.7     Recent Labs     02/02/20  0339   AST 21   ALT 12   BILITOT 0.6   ALKPHOS 83     Recent Labs     02/02/20  0339   INR 0.87     No results for input(s): Michelle Ran in the last 72 hours.     Urinalysis:      Lab Results   Component Value Date    NITRU NEGATIVE 09/12/2018    WBCUA 0-2 09/12/2018    BACTERIA NONE 09/12/2018    RBCUA 0-2 09/12/2018    BLOODU NEGATIVE 09/12/2018    GLUCOSEU NEGATIVE 09/12/2018       Radiology:  No orders to display       Diet: DIET FULL LIQUID;    DVT prophylaxis: [] Lovenox                                 [] SCDs                                 [] SQ Heparin                                 [] Encourage ambulation           [] Already on Anticoagulation     Disposition:    [x] Home       [] TCU       [] Rehab       [] Psych       [] SNF       [] [facility]       [] Other-    Code Status: Full Code    PT/OT Eval Status: na      Electronically signed by Eleazar Reynolds MD on 2/3/2020 at 8:19 AM

## 2020-02-03 NOTE — PROGRESS NOTES
Gastroenterology Progress Note:     Patient Name:  Lauri Perea   MRN: [id number]  [id number]  YOB: 1945  Admit Date: 2/2/2020  3:23 AM  Primary Care Physician: Romeo Lugo MD   5K-20/020-A   Patient awake and alert. He reports last bowel movement with blood was yesterday around 3 PM.  Is not passing gas and tolerating clear liquid diet. He denies any abdominal pain. No nausea or vomiting. I spoke to staff RN and no bleeding episodes overnight. Patient states about 2 days prior to bleeding episode he had a couple days of constipation requiring excessive straining. He states over the last month he is noted increasing belching and some upper abdominal pain occurring intermittently after eating. Occasionally has reflux symptoms but this is not routine. He denies NSAID use. Patient seen and examined. 24 hours events and chart reviewed. Review of Systems  Neuro- negative for headache, dizziness, altered level of consciousness  Cardiovascular- negative for CP, SOB, peripheral edema, orthopnea      (1   )Medications Reviewed ;(   )Old records reviewed    I/O last 3 completed shifts: In: 9391 [P.O.:680; I.V.:545]  Out: 0   No intake/output data recorded. Diet:  DIET CLEAR LIQUID;      /83   Pulse 91   Temp 98 °F (36.7 °C) (Oral)   Resp 16   Ht 5' 9\" (1.753 m)   Wt 164 lb 8 oz (74.6 kg)   SpO2 95%   BMI 24.29 kg/m²     Physical Exam:    General:  Nourished in no distress  HEENT: Atraumatic, normocephalic. Moist oral mucous membranes. Nares no drainage. Sclera anicteric. CV: Heart RRR with s1 s2 heard, no murmurs, rubs, gallops. Resp: Even, easy without cough or accessory use. Lungs clear to ascultation bilaterally. Abd: Round, soft, nontender. No hepatosplenomegaly or mass present. Active bowel sounds heard. No distention noted. Ext:  Without cyanosis, clubbing, edema. Skin: Pink, warm, dry  Neuro:  Alert, oriented x3 with no obvious deficits.        Rectal: recommendations based on clinical course. Patient will need to follow-up with GI 1 week after discharge.       Addendum: Pt with large bloody stool will order NM GI bleeding scan,    Case reviewed and impression/plan reviewed in collaboration with Dr. Nathalia Livingston, CNP for Dr. Chris Silver   2/3/2020

## 2020-02-03 NOTE — FLOWSHEET NOTE
02/02/20 2015   Encounter Summary   Services provided to: Patient   Referral/Consult From: Nurse   Support System Spouse; Children;Family members   Place of [address] Visiting Yes  (2/2/20 Advanced directive consultation)   Complexity of Encounter Moderate   Length of Encounter 15 minutes   Spiritual Assessment Completed Yes   Spiritual/[place]ai   Type Spiritual support   Assessment Approachable;Calm; Hopeful   Intervention Active listening;Nurtured hope;Discussed illness/injury and it's impact   Outcome Shared life review;Expressed gratitude;Engaged in conversation   Advance Directives (For Healthcare)   Healthcare Directive No, patient does not have an advance directive for healthcare treatment   Advance Directives Unable to complete; Documents given;Documents explained     Tracie Brunner is a 66year old male. He is sitting in his bed awake as he is watching the football game. Patient candidly shared his thoughts about his illness and his relationship with his grandchildren. Patient said that his daughter passes away when their grandchildren were very young. Patient said both he and his wife have raised their grandchildren. Patient also told me that he has no Health Care power of  nor Living Will. During this conversation, patient shared with me that he will talk with his wife regarding this matter. Pt will discuss the matter with his wife. SC will follow up for continue emottional support.

## 2020-02-04 VITALS
RESPIRATION RATE: 16 BRPM | BODY MASS INDEX: 23.83 KG/M2 | HEIGHT: 69 IN | DIASTOLIC BLOOD PRESSURE: 68 MMHG | SYSTOLIC BLOOD PRESSURE: 143 MMHG | WEIGHT: 160.9 LBS | HEART RATE: 74 BPM | OXYGEN SATURATION: 97 % | TEMPERATURE: 98.1 F

## 2020-02-04 LAB
ANION GAP SERPL CALCULATED.3IONS-SCNC: 11 MEQ/L (ref 8–16)
BUN BLDV-MCNC: 9 MG/DL (ref 7–22)
CALCIUM SERPL-MCNC: 8.6 MG/DL (ref 8.5–10.5)
CHLORIDE BLD-SCNC: 107 MEQ/L (ref 98–111)
CO2: 24 MEQ/L (ref 23–33)
CREAT SERPL-MCNC: 0.7 MG/DL (ref 0.4–1.2)
ERYTHROCYTE [DISTWIDTH] IN BLOOD BY AUTOMATED COUNT: 13.7 % (ref 11.5–14.5)
ERYTHROCYTE [DISTWIDTH] IN BLOOD BY AUTOMATED COUNT: 49.7 FL (ref 35–45)
GFR SERPL CREATININE-BSD FRML MDRD: > 90 ML/MIN/1.73M2
GLUCOSE BLD-MCNC: 100 MG/DL (ref 70–108)
HCT VFR BLD CALC: 37.3 % (ref 42–52)
HCT VFR BLD CALC: 38 % (ref 42–52)
HEMOGLOBIN: 11.8 GM/DL (ref 14–18)
HEMOGLOBIN: 12 GM/DL (ref 14–18)
MCH RBC QN AUTO: 31.1 PG (ref 26–33)
MCHC RBC AUTO-ENTMCNC: 31.6 GM/DL (ref 32.2–35.5)
MCV RBC AUTO: 98.4 FL (ref 80–94)
PLATELET # BLD: 201 THOU/MM3 (ref 130–400)
PMV BLD AUTO: 10.1 FL (ref 9.4–12.4)
POTASSIUM SERPL-SCNC: 4 MEQ/L (ref 3.5–5.2)
RBC # BLD: 3.86 MILL/MM3 (ref 4.7–6.1)
SODIUM BLD-SCNC: 142 MEQ/L (ref 135–145)
WBC # BLD: 6 THOU/MM3 (ref 4.8–10.8)

## 2020-02-04 PROCEDURE — 6370000000 HC RX 637 (ALT 250 FOR IP): Performed by: FAMILY MEDICINE

## 2020-02-04 PROCEDURE — 36415 COLL VENOUS BLD VENIPUNCTURE: CPT

## 2020-02-04 PROCEDURE — 6370000000 HC RX 637 (ALT 250 FOR IP): Performed by: INTERNAL MEDICINE

## 2020-02-04 PROCEDURE — 85027 COMPLETE CBC AUTOMATED: CPT

## 2020-02-04 PROCEDURE — G0378 HOSPITAL OBSERVATION PER HR: HCPCS

## 2020-02-04 PROCEDURE — 80048 BASIC METABOLIC PNL TOTAL CA: CPT

## 2020-02-04 PROCEDURE — 2580000003 HC RX 258: Performed by: NURSE PRACTITIONER

## 2020-02-04 PROCEDURE — 99217 PR OBSERVATION CARE DISCHARGE MANAGEMENT: CPT | Performed by: FAMILY MEDICINE

## 2020-02-04 PROCEDURE — 6370000000 HC RX 637 (ALT 250 FOR IP): Performed by: NURSE PRACTITIONER

## 2020-02-04 RX ORDER — PANTOPRAZOLE SODIUM 40 MG/1
40 TABLET, DELAYED RELEASE ORAL
Qty: 30 TABLET | Refills: 0 | Status: SHIPPED | OUTPATIENT
Start: 2020-02-05 | End: 2021-05-26 | Stop reason: ALTCHOICE

## 2020-02-04 RX ORDER — PSEUDOEPHEDRINE HCL 30 MG
100 TABLET ORAL DAILY
Qty: 30 CAPSULE | Refills: 0 | Status: SHIPPED | OUTPATIENT
Start: 2020-02-05 | End: 2021-05-26

## 2020-02-04 RX ADMIN — Medication 100 MG: at 10:01

## 2020-02-04 RX ADMIN — THERA TABS 1 TABLET: TAB at 10:01

## 2020-02-04 RX ADMIN — SODIUM CHLORIDE: 9 INJECTION, SOLUTION INTRAVENOUS at 09:03

## 2020-02-04 RX ADMIN — AMLODIPINE BESYLATE 10 MG: 10 TABLET ORAL at 10:01

## 2020-02-04 RX ADMIN — PANTOPRAZOLE SODIUM 40 MG: 40 TABLET, DELAYED RELEASE ORAL at 06:13

## 2020-02-04 RX ADMIN — FOLIC ACID 1 MG: 1 TABLET ORAL at 10:01

## 2020-02-04 ASSESSMENT — PAIN SCALES - GENERAL
PAINLEVEL_OUTOF10: 0
PAINLEVEL_OUTOF10: 0

## 2020-02-04 NOTE — PROGRESS NOTES
Gastroenterology Progress Note:     Patient Name:  Earl Sierra   MRN: [id number]  [id number]  YOB: 1945  Admit Date: 2/2/2020  3:23 AM  Primary Care Physician: Lyubov Hines MD   5K-20/020-A   Patient awake and alert. Last bloody bowel movement was yesterday afternoon. He denies any nausea or vomiting. He describes some belching and bloating with acid reflux. No odynophagia or dysphagia. He states he is hungry and is asking to eat. Patient seen and examined. 24 hours events and chart reviewed. Review of Systems  Neuro- negative for headache, dizziness, altered level of consciousness  Cardiovascular- negative for CP, SOB, peripheral edema, orthopnea      ( *  )Medications Reviewed ;(   )Old records reviewed    I/O last 3 completed shifts: In: 2402 [P.O.:1200; I.V.:1202]  Out: -   No intake/output data recorded. Diet:  DIET CLEAR LIQUID;      BP (!) 144/78   Pulse 87   Temp 98.3 °F (36.8 °C) (Oral)   Resp 16   Ht 5' 9\" (1.753 m)   Wt 163 lb 1.6 oz (74 kg)   SpO2 95%   BMI 24.09 kg/m²     Physical Exam:    General:  Nourished in no distress  HEENT: Atraumatic, normocephalic. Moist oral mucous membranes. CV: Heart RRR with s1 s2 heard, no murmurs, rubs, gallops. Resp: Even, easy without cough or accessory use. Lungs clear to ascultation bilaterally. Abd: Round, soft, nontender. No hepatosplenomegaly or mass present. Active bowel sounds heard. No distention noted. Ext:  Without cyanosis, clubbing, edema. Skin: Pink, warm, dry  Neuro:  Alert, oriented x3 with no obvious deficits.        Rectal: deferred  Lines/tubes:       Labs: WBC:    Lab Results   Component Value Date    WBC 5.7 02/03/2020     Platelets:    Lab Results   Component Value Date     02/03/2020     Hemoglobin/Hematocrit:    Lab Results   Component Value Date    HGB 11.8 02/03/2020    HCT 37.3 02/03/2020     BMP:    Lab Results   Component Value Date     02/03/2020    K 4.3 02/03/2020    K 4.3 02/02/2020     02/03/2020    CO2 25 02/03/2020    BUN 11 02/03/2020    LABALBU 4.0 02/02/2020    CREATININE 0.6 02/03/2020    CALCIUM 8.7 02/03/2020    LABGLOM >90 02/03/2020    GLUCOSE 125 02/03/2020     Hepatic Function Panel:    Lab Results   Component Value Date    ALKPHOS 83 02/02/2020    ALT 12 02/02/2020    AST 21 02/02/2020    PROT 6.6 02/02/2020    BILITOT 0.6 02/02/2020    BILIDIR <0.2 09/12/2018    LABALBU 4.0 02/02/2020     Calcium:    Lab Results   Component Value Date    CALCIUM 8.7 02/03/2020     PT/INR:    Lab Results   Component Value Date    INR 0.87 02/02/2020     PTT:    Lab Results   Component Value Date    APTT 29.7 02/02/2020   [APTT     Significant Diagnostic Studies:     Current Meds:  Scheduled Meds:   docusate sodium  100 mg Oral Daily    pantoprazole  40 mg Oral QAM AC    amLODIPine  10 mg Oral Daily    sodium chloride flush  10 mL Intravenous 2 times per day    nicotine  1 patch Transdermal Daily    thiamine  100 mg Oral Daily    folic acid  1 mg Oral Daily    multivitamin  1 tablet Oral Daily     Continuous Infusions:   sodium chloride 100 mL/hr at 02/03/20 1558     PRN Meds:.sodium chloride flush, ondansetron, potassium chloride, magnesium sulfate, acetaminophen, LORazepam **OR** LORazepam **OR** LORazepam **OR** LORazepam **OR** LORazepam **OR** LORazepam **OR** LORazepam **OR** LORazepam     Rajani Adam is a 25-year-old  male admitted to the hospital 2/2/2020 due to bright red blood per rectum; underlying history of recurrent  GI bleed ( 2016, 2018 requiring hospitalization) suspected to be diverticular in nature, adenomatous colon polyp,daily alcohol use,tobacco abuse, hypertension who presented. On admission hemoglobin 14.5. Has not required blood transfusion. Occult blood feces is positive. Large bloody stool 2/3/2020 but bleeding scan negative. No abdominal pain, hemodynamically stable.   Tolerating clear liquid diet        Patient hospitalized in 2016 and then

## 2020-02-04 NOTE — PLAN OF CARE
Problem: Bowel Function - Altered:  Goal: Bowel elimination is within specified parameters  Description  Bowel elimination is within specified parameters  Outcome: Ongoing  Note:   No BM's noted this shift yet. No bloody Bm's. Problem: Fluid Volume - Imbalance:  Goal: Will show no signs and symptoms of excessive bleeding  Description  Will show no signs and symptoms of excessive bleeding  Outcome: Ongoing  Note:   Hgb 12 today. No s/s of bleeding     Problem: Safety:  Goal: Free from accidental physical injury  Description  Free from accidental physical injury  Outcome: Ongoing  Note:   Patient remains free from falls this shift. Call light and bedside table within reach. Bed in lowest position with alarm on. Rounding hourly. Problem: Discharge Planning:  Goal: Patients continuum of care needs are met  Description  Patients continuum of care needs are met  Outcome: Ongoing  Note:   Plan for possible discharge either today or tomorrow once patient has blood free BM. Care plan reviewed with patient. Patient verbalizes understanding of the plan of care and contribute to goal setting.

## 2020-02-04 NOTE — PROGRESS NOTES
PROGRESS NOTE      Patient:  Ninfa Schumacher      Unit/Bed:5K-20/020-A    YOB: 1945    MRN: [id number]       Acct: [de-identified]     PCP: Natty Hylton MD    Date of Admission: 2/2/2020      Assessment/Plan:    Acute bright red blood per rectum likely 2/2 diverticulosis  History of prior diverticular bleeds in 2016 and 2018   + fecal occult blood  GI consult- plan to advance diet, possible DC tomorrow. F/u in 1 week outpatient   No plans for colonoscopy at this time   Conservative management, gently hydration, trend H&H  NM GL blood loss scan 2/3/20  Protonix daily   Avoid NSAIDs     Acute anemia 2/2 above  Trend H&H  INR and aPTT wnl  Transfuse if <8 per GI  Type and screen completed   Stabilizing 14.5 -> 13.2 -> 12.9 -> 12.7 -> 12.6 x3 ->11.8 -> 12.0  CBC in am     HTN  Home norvasc with hold parameters  BP stable     Alcohol abuse  Reports 3 beers daily  Vitamin supplements, folate and thiamine   CIWA scale to monitor   No s/s of withdrawal     Smoker  Nicotine patch    Hx of Diverticular bleed  2016 and 2018      Anticipated Discharge in: 1-2 days    Chief Complaint: [facility] Course:   66 y.o. male who presented to [facility] with chief complaint of bright red blood per rectum.     Patient was in his usual state of health, and had 3 beers in the evening as usual and was continuing to smoke 1 pack cigarettes per day and doing fairly good until 2:00 the morning of admission. He felt cramping in the abdomen and felt the need to go to the bathroom and had a dark stool with little blood in it. In the next 1 hour again he had another 2 bowel movements and states it was all blood. Patient had bleeding in September 2018 and in 2016 and was suspected to have diverticular bleeding and came to the emergency room as he was worried about bleeding again.     While in the emergency room he had 2 more episodes of small bright red blood per rectum.  Denied any other associated symptoms especially nausea, chest pain dizziness lightheadedness. No shortness of breath. Does not want to quit smoking or drinking alcohol .     Hemoglobin 14.5 on admission. Not taking any iron supplements at home or Protonix. H&H trended. Patient was managed conservatively with IVF and PPI. Liquid diet. H&H was trended and there was no need for transfusion. Patient had additional red streaked stool and a nuclear med bleeding scan was found to be negative. Patient continued to do well with advancing diet, no n/v or abd pain. Subjective: patient seen and evaluated  Feeling well today, complains of boredom   Tolerating CLD, minimal abd cramping, no pain  Ambulating well  No BM since yesterday morning which had red streaks   Denies any CP, abd pain, SOB, n/v    Medications:  Reviewed    Infusion Medications    sodium chloride 100 mL/hr at 02/03/20 1558     Scheduled Medications    docusate sodium  100 mg Oral Daily    pantoprazole  40 mg Oral QAM AC    amLODIPine  10 mg Oral Daily    sodium chloride flush  10 mL Intravenous 2 times per day    nicotine  1 patch Transdermal Daily    thiamine  100 mg Oral Daily    folic acid  1 mg Oral Daily    multivitamin  1 tablet Oral Daily     PRN Meds: sodium chloride flush, ondansetron, potassium chloride, magnesium sulfate, acetaminophen, LORazepam **OR** LORazepam **OR** LORazepam **OR** LORazepam **OR** LORazepam **OR** LORazepam **OR** LORazepam **OR** LORazepam      Intake/Output Summary (Last 24 hours) at 2/4/2020 332  Last data filed at 2/3/2020 2000  Gross per 24 hour   Intake 1629 ml   Output --   Net 1629 ml       Diet:  DIET GENERAL; Low Sodium (2 GM)    Exam:  BP (!) 144/78   Pulse 87   Temp 98.3 °F (36.8 °C) (Oral)   Resp 16   Ht 5' 9\" (1.753 m)   Wt 163 lb 1.6 oz (74 kg)   SpO2 95%   BMI 24.09 kg/m²     General appearance: No apparent distress, appears stated age and cooperative. Laying in bed  HEENT: Pupils equal, round, and reactive to light. Conjunctivae/corneas clear. Neck: Supple, with full range of motion. No jugular venous distention. Trachea midline. Respiratory:  Normal respiratory effort. Clear to auscultation, bilaterally without Rales/Wheezes/Rhonchi. Cardiovascular: Regular rate and rhythm with normal S1/S2 without murmurs, rubs or gallops. Abdomen: Soft, non-tender, non-distended with normal bowel sounds. Musculoskeletal: passive and active ROM x 4 extremities. Skin: Skin color, texture, turgor normal.  No rashes or lesions. Neurologic:  Neurovascularintact without any focal sensory/motor deficits. Cranial nerves: II-XII intact, groly ssly non-focal.  Psychiatric: Alert and oriented, thought content appropriate, normal insight  Capillary Refill: Brisk,< 3 seconds   Peripheral Pulses: +2 palpable, equal bilaterally       Labs:   Recent Labs     02/02/20  0339  02/03/20  0709  02/03/20  1819 02/03/20  2349 02/04/20  0656   WBC 7.3  --  5.7  --   --   --  6.0   HGB 14.5   < > 12.6*   < > 12.6* 11.8* 12.0*   HCT 44.6   < > 39.2*   < > 38.7* 37.3* 38.0*     --  204  --   --   --  201    < > = values in this interval not displayed. Recent Labs     02/02/20  0339 02/03/20  0709    142   K 4.3 4.3    105   CO2 23 25   BUN 17 11   CREATININE 0.6 0.6   CALCIUM 9.2 8.7     Recent Labs     02/02/20  0339   AST 21   ALT 12   BILITOT 0.6   ALKPHOS 83     Recent Labs     02/02/20  0339   INR 0.87     No results for input(s): Johnie Shall in the last 72 hours. Urinalysis:      Lab Results   Component Value Date    NITRU NEGATIVE 09/12/2018    WBCUA 0-2 09/12/2018    BACTERIA NONE 09/12/2018    RBCUA 0-2 09/12/2018    BLOODU NEGATIVE 09/12/2018    GLUCOSEU NEGATIVE 09/12/2018       Radiology:  NM GI BLOOD LOSS   Final Result      No scintigraphic evidence of active gastrointestinal bleeding.       Final report electronically signed by Dr. Paulina Molina on 2/3/2020 1:33 PM          Diet: DIET GENERAL; Low Sodium (2 GM)    DVT prophylaxis: [] Lovenox                                 [] SCDs                                 [] SQ Heparin                                 [] Encourage ambulation           [] Already on Anticoagulation     Disposition:    [x] Home       [] TCU       [] Rehab       [] Psych       [] SNF       [] [facility]       [] Other-    Code Status: Full Code    PT/OT Eval Status: na      Electronically signed by Loren Walters MD on 2/4/2020 at 8:03 AM

## 2020-02-04 NOTE — DISCHARGE SUMMARY
DISCHARGE SUMMARY      Patient Identification:   Dmitriy Cruz   : 1945  MRN: [id number]   Account: [de-identified]      Patient's PCP: Wilton Sal MD    Admit Date: 2020     Discharge Date:   2020    Admitting Physician: Gil Mitchell MD     Discharge Physician: Bishop Megan MD     Discharge Diagnoses:  Acute bright red blood per rectum likely 2/2 diverticulosis  Acute anemia 2/2 above  Essential HTN  Alcohol abuse  Smoker  Hx of Diverticular bleed    The patient was seen and examined on day of discharge and this discharge summary is in conjunction with any daily progress note from day of discharge. Hospital Course:   Dmitriy Cruz is a 66 y.o. male admitted to [facility] on 2020 for bright red blood per rectum. Patient was in his usual state of health, and had 3 beers in the evening as usual and was continuing to smoke 1 pack cigarettes per day and doing fairly good until 2:00am the morning of admission. He felt cramping in the abdomen and felt the need to go to the bathroom and had a dark stool with little blood in it. In the next 1 hour again he had another 2 bowel movements and states it was all blood. Patient had bleeding in 2018 and in  and was suspected to have diverticular bleeding and came to the emergency room as he was worried about bleeding again.     While in the emergency room he had 2 more episodes of small bright red blood per rectum. Denied any other associated symptoms especially nausea, chest pain dizziness lightheadedness.  No shortness of breath.   Does not want to quit smoking or drinking alcohol .     Hemoglobin 14.5 on admission. Not taking any iron supplements at home or Protonix.     Patient was managed conservatively with IVF and PPI. Liquid diet. H&H was trended and there was no need for transfusion. Patient had additional red streaked stool and a nuclear med bleeding scan was found to be negative.  Patients H&H 02/04/2020    HGB 12.0 02/04/2020    HCT 38.0 02/04/2020     02/04/2020       Renal:    Lab Results   Component Value Date     02/04/2020    K 4.0 02/04/2020    K 4.3 02/02/2020     02/04/2020    CO2 24 02/04/2020    BUN 9 02/04/2020    CREATININE 0.7 02/04/2020    CALCIUM 8.6 02/04/2020         Significant Diagnostic Studies    Radiology:   RADIOLOGY REPORT   Final Result      NM GI BLOOD LOSS   Final Result      No scintigraphic evidence of active gastrointestinal bleeding.       Final report electronically signed by Dr. Yvette Torres on 2/3/2020 1:33 PM           Consults:     IP CONSULT TO GI  IP CONSULT TO SPIRITUAL SERVICES    Disposition:    [x] Home       [] TCU       [] Rehab       [] Psych       [] SNF       [] [facility]       [] Other-    Condition at Discharge: Stable    Code Status:  Full Code     Patient Instructions:    Discharge lab work: CBC in 1 week, prior to GI appointment   Activity: activity as tolerated   Diet: DIET GENERAL; Low Sodium (2 GM)      Follow-up visits:   Wilton Sal MD  [address] [phone]    Schedule an appointment as soon as possible for a visit      MD Stanley Barron. [facility] [address]    Go on 2/12/2020  at 9:30am         Discharge Medications:      Sommer Wright   [facility] Medication Instructions [id number]    Printed on:02/04/20 4036   Medication Information                      acetaminophen (TYLENOL) 500 MG tablet  Take 1,000 mg by mouth every 6 hours as needed for Pain             amLODIPine (NORVASC) 10 MG tablet  Take 1 tablet by mouth daily             docusate sodium (COLACE, DULCOLAX) 100 MG CAPS  Take 100 mg by mouth daily             Misc Natural Products (GLUCOS-CHONDROIT-MSM COMPLEX) TABS  Take 1 tablet by mouth daily             Multiple Vitamins-Minerals (THERAPEUTIC MULTIVITAMIN-MINERALS) tablet  Take 1 tablet by mouth daily

## 2020-05-08 ENCOUNTER — TELEPHONE (OUTPATIENT)
Dept: AUDIOLOGY | Age: 75
End: 2020-05-08

## 2020-05-19 ENCOUNTER — HOSPITAL ENCOUNTER (OUTPATIENT)
Dept: AUDIOLOGY | Age: 75
Discharge: HOME | End: 2020-05-19

## 2020-05-19 PROCEDURE — V5014 HEARING AID REPAIR/MODIFYING: HCPCS | Performed by: AUDIOLOGIST

## 2020-06-05 ENCOUNTER — HOSPITAL ENCOUNTER (OUTPATIENT)
Dept: AUDIOLOGY | Age: 75
Discharge: HOME | End: 2020-06-05

## 2020-06-05 PROCEDURE — 9990000010 HC NO CHARGE VISIT: Performed by: AUDIOLOGIST

## 2020-07-16 ENCOUNTER — HOSPITAL ENCOUNTER (OUTPATIENT)
Dept: AUDIOLOGY | Age: 75
Discharge: HOME | End: 2020-07-16

## 2020-07-16 PROCEDURE — V5267 HEARING AID SUP/ACCESS/DEV: HCPCS | Performed by: AUDIOLOGIST

## 2020-07-16 PROCEDURE — 92592 HC HEARING AID CHECK, ONE EAR: CPT | Performed by: AUDIOLOGIST

## 2021-01-04 ENCOUNTER — TELEPHONE (OUTPATIENT)
Dept: AUDIOLOGY | Age: 76
End: 2021-01-04

## 2021-01-04 NOTE — TELEPHONE ENCOUNTER
Patient dropped off LEFT hearing aid. He states that it only works half of the day. He has been using the beads for a hearing aid drying agent and he states that it doesn't seem to help. Ed's left hearing aid is still under warranty. Please examine.

## 2021-01-04 NOTE — TELEPHONE ENCOUNTER
Due to the intermittency issue and because the hearing aid is under warranty, the hearing aid will be sent in for repair. The patient will be contacted when it comes back. Please inform the pateint.

## 2021-01-08 ENCOUNTER — TELEPHONE (OUTPATIENT)
Dept: AUDIOLOGY | Age: 76
End: 2021-01-08

## 2021-01-11 ENCOUNTER — TELEPHONE (OUTPATIENT)
Dept: AUDIOLOGY | Age: 76
End: 2021-01-11

## 2021-01-11 NOTE — TELEPHONE ENCOUNTER
The patient stopped by Audiology department to  his repaired LEFT hearing aid. He wanted to know what the  found- explained that they replaced the  in the hearing aid. He asked what can be done for prevention. I encouraged him to clean the wax out every evening. He could bring the hearing aids in for clean/check/vacuum every three months. He can also considering purchasing an electronic dryer. Through the conversation, he told me that he licks his hearing aids to get them to go in his ears properly. I told him that he should not do this and that I am unsure of what type of moisture issue this may cause.

## 2021-01-28 ENCOUNTER — HOSPITAL ENCOUNTER (OUTPATIENT)
Dept: AUDIOLOGY | Age: 76
Discharge: HOME | End: 2021-01-28

## 2021-01-28 PROCEDURE — 9990000010 HC NO CHARGE VISIT: Performed by: AUDIOLOGIST

## 2021-01-28 NOTE — PROGRESS NOTES
ACCOUNT #: [de-identified]    DIAGNOSIS: H90.3    HEARING AID PROBLEM: Patient having feedback from his left hearing aid that was recently repaired. Otoscopy was clear on both sides. The feedback manager was set on the repaired hearing aid. Feedback resolved. I encouraged the patient to have more regularly scheduled follow up instead of just dropping off his hearing aids when he has issues. His hearing aids are also more than 15years old. I encouraged him to consider new hearing aid rather than repeatedly repair his current hearing aids due to their age and obsolete technology. He was provided current hearing aid pricing and policies. He would need a new hearing test in order to obtain new amplification.

## 2021-03-29 ENCOUNTER — HOSPITAL ENCOUNTER (OUTPATIENT)
Dept: AUDIOLOGY | Age: 76
Discharge: HOME | End: 2021-03-29
Payer: MEDICARE

## 2021-03-29 PROCEDURE — 92557 COMPREHENSIVE HEARING TEST: CPT | Performed by: AUDIOLOGIST

## 2021-03-29 PROCEDURE — 9990000010 HC NO CHARGE VISIT: Performed by: AUDIOLOGIST

## 2021-03-29 PROCEDURE — 92567 TYMPANOMETRY: CPT | Performed by: AUDIOLOGIST

## 2021-03-29 NOTE — PROGRESS NOTES
AUDIOLOGICAL EVALUATION      REASON FOR TESTING:  Audiometric evaluation completed due to the patient wishing to obtain new hearing aids. His current [facility] CIC for the right ear is non-functional. The patient reported increased hearing loss during the last year particularly in his left ear. His left ear has poorer hearing than his right ear. He denied any otalgia, dizziness or tinnitus. OTOSCOPY: Clear canal, bilaterally. 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 Speech discrimination ability is good at 88% for the left ear and good at 84% for the right ear. Tympanometry revealed normal peak pressure and normal middle ear compliance for the right ear and normal middle ear compliance with excessive negative middle ear pressure indicating middle ear dysfunction for the left ear. RECOMMENDATION(S):   1- ENT consult is recommended due to the mixed hearing loss in the left ear. 2- Repeat hearing testing following any medical management. 3-Upon completion of any medical management of the mixed hearing loss and with medical clearance, new binaural amplification is recommended. 2-3Regular audiometric testing is recommended to monitor hearing levels. NEW HEARING AID CONSULTATION:  The patient did not wish to pursue a medical evaluation/management as recommended prior to pursuing new hearing aids. Available hearing aid styles, technologies and options were discussed. Although a behind the ear/  in the canal style was encouraged, the patient is interested in pursuing [facility] [place] CIC hearing aids. Bilateral ear impressions were made on this date without incident. His insurance benefits will be verified. The patient's hearing aid fitting is scheduled for 05/10/2021.

## 2021-04-14 ENCOUNTER — TELEPHONE (OUTPATIENT)
Dept: AUDIOLOGY | Age: 76
End: 2021-04-14

## 2021-04-14 NOTE — TELEPHONE ENCOUNTER
Patient has hearing aid coverage through [employer] 1540 per Teofilo Kiran. I called Ezra Cunha is NOT eligible for hearing aids through us due to him having the [employer] plan. The phone number for the patient to call and find a in-network provider is [phone].

## 2021-04-30 ENCOUNTER — HOSPITAL ENCOUNTER (OUTPATIENT)
Dept: AUDIOLOGY | Age: 76
Discharge: HOME | End: 2021-04-30

## 2021-04-30 PROCEDURE — V5160 DISPENSING FEE BINAURAL: HCPCS | Performed by: AUDIOLOGIST

## 2021-04-30 PROCEDURE — V5258 HEARING AID, DIGIT, BIN, CIC: HCPCS | Performed by: AUDIOLOGIST

## 2021-04-30 NOTE — PROGRESS NOTES
[facility]#: [id number]   ACCT#: [de-identified]    DIAGNOSIS: H90.3    NEW HEARING AID FITTING: A [employer] i2000 CIC hearing aid was fit and dispensed for both ears. Explained care, use and insertion/removal.  Programmed. Hearing aid fitting recheck scheduled for 05/10/2021. SPEECH MAPPING:    The [employer] real ear system was used to perform verification of Ed's hearing aid fitting.  The output of Ed's Sheryl amplification was reprogrammed to match appropriate NAL-2 targets for MPO, soft, medium and loud stimuli utilizing speech mapping based on his 03/29/2021 audiogram.        Speech mapping results suggest: appropriately fit amplification

## 2021-05-10 ENCOUNTER — HOSPITAL ENCOUNTER (OUTPATIENT)
Dept: AUDIOLOGY | Age: 76
Discharge: HOME | End: 2021-05-10

## 2021-05-10 PROCEDURE — 9990000010 HC NO CHARGE VISIT: Performed by: AUDIOLOGIST

## 2021-05-10 NOTE — PROGRESS NOTES
Hearing aid fitting recheck- The patient stated that his battery dies quicker in his left hearing aid then his right hearing aid and that he has been having to frequently change the  filter in his left hearing aid. He also has some chirping feedback while chewing in his right hearing aid. The patient was counseled regarding battery drain in his left hearing aid compared to his right hearing aid. The left hearing aid will have a higher battery drain due to the power circuit. I suspect the filter issue is arising from the patient moistening the hearing aid prior to inserting it . I again discouraged the practice and reminded him to lift up of his pinna to open the canal to make it easier to put his hearing aids in. The feedback manager was re-run on the right and output slightly adjusted to relieve the feedback. The patient was satisfied with the information shared, the adjustments and further instruction. He did not wish to schedule further follow up at this time.

## 2021-05-26 ENCOUNTER — PREP FOR PROCEDURE (OUTPATIENT)
Dept: SURGERY | Age: 76
End: 2021-05-26

## 2021-05-26 ENCOUNTER — OFFICE VISIT (OUTPATIENT)
Dept: SURGERY | Age: 76
End: 2021-05-26
Payer: MEDICARE

## 2021-05-26 VITALS
OXYGEN SATURATION: 98 % | RESPIRATION RATE: 18 BRPM | HEIGHT: 67 IN | SYSTOLIC BLOOD PRESSURE: 128 MMHG | TEMPERATURE: 97.4 F | HEART RATE: 73 BPM | WEIGHT: 164 LBS | DIASTOLIC BLOOD PRESSURE: 72 MMHG | BODY MASS INDEX: 25.74 KG/M2

## 2021-05-26 DIAGNOSIS — D17.6 LIPOMA, SPERMATIC CORD: ICD-10-CM

## 2021-05-26 DIAGNOSIS — Z01.818 PRE-OP TESTING: Primary | ICD-10-CM

## 2021-05-26 DIAGNOSIS — K40.91 RECURRENT LEFT INGUINAL HERNIA: ICD-10-CM

## 2021-05-26 PROCEDURE — 99203 OFFICE O/P NEW LOW 30 MIN: CPT | Performed by: SURGERY

## 2021-05-26 RX ORDER — OMEPRAZOLE 40 MG/1
40 CAPSULE, DELAYED RELEASE ORAL DAILY
COMMUNITY

## 2021-05-26 RX ORDER — CALCIUM CARBONATE 500(1250)
500 TABLET ORAL DAILY
COMMUNITY

## 2021-05-26 ASSESSMENT — ENCOUNTER SYMPTOMS
ABDOMINAL PAIN: 1
VOICE CHANGE: 0
EYE REDNESS: 0
COUGH: 0
FACIAL SWELLING: 0
SHORTNESS OF BREATH: 0
TROUBLE SWALLOWING: 0
CHOKING: 0
DIARRHEA: 0
BACK PAIN: 0
APNEA: 0
RHINORRHEA: 0
NAUSEA: 0
VOMITING: 0
BLOOD IN STOOL: 0
CHEST TIGHTNESS: 0
SORE THROAT: 0
ABDOMINAL DISTENTION: 0
WHEEZING: 0
EYE DISCHARGE: 0
RECTAL PAIN: 0
CONSTIPATION: 0
EYE PAIN: 0
PHOTOPHOBIA: 0
SINUS PRESSURE: 0
STRIDOR: 0
EYE ITCHING: 0
ANAL BLEEDING: 0
COLOR CHANGE: 0

## 2021-05-26 NOTE — PROGRESS NOTES
Pradaxa, Eliquis and Xarelto. Continuation of 81 mg Aspirin is acceptable. 7. Perioperative medical clearance not needed   Orders Placed This Encounter:  Orders Placed This Encounter   Procedures    Basic Metabolic Panel     Standing Status:   Future     Standing Expiration Date:   5/26/2022    Hemoglobin and Hematocrit, Blood     Standing Status:   Future     Standing Expiration Date:   5/26/2022    EKG 12 lead     Standing Status:   Future     Standing Expiration Date:   5/26/2022     Order Specific Question:   Reason for Exam?     Answer:   Pre-op    ME REPAIR RECURR [facility]      Bhupinder Thompson is a 66 y.o. male seen in the office for evaluation of a recurrent inguinal hernia. Symptoms were first noted 3 weeks ago and has gradually worsened since that time. The pain is dull, moderate in severity. We have seen in the renal past in 2012 where he had bilateral inguinal hernia repair with Kugel patch preperitoneal for bilateral recurrent inguinal hernias. He did not really notice any issues with his left groin until about the last 3 weeks been having pain on exam they could feel bulge or lump which did not reduce and was concern for incarceration. Has had no nausea no vomiting does has discomfort. He also describes he is having pain that radiates into his back when he sits down on his wallet he has pain into his groin and his thigh which I think is unrelated. No previsit testing has been done.   Past Medical History  Past Medical History:   Diagnosis Date    Arthritis     Diverticular hemorrhage     Dr. Sandoval Morning History of blood transfusion     Hypertension        Past Surgical History  Past Surgical History:   Procedure Laterality Date    COLONOSCOPY      Dr. Benito León one by Dr. Pascual Cadet in 2012   Paul Campo       Medications  Current Outpatient Medications on File Prior to Visit   Medication Sig Dispense Refill    omeprazole (PRILOSEC) 40 MG delayed release capsule Take 40 mg by mouth daily      calcium carbonate (OSCAL) 500 MG TABS tablet Take 500 mg by mouth daily      polycarbophil (FIBERCON) 625 MG tablet Take 625 mg by mouth daily      amLODIPine (NORVASC) 10 MG tablet Take 1 tablet by mouth daily (Patient taking differently: Take 5 mg by mouth daily ) 30 tablet 3    Multiple Vitamins-Minerals (THERAPEUTIC MULTIVITAMIN-MINERALS) tablet Take 1 tablet by mouth daily      Misc Natural Products (GLUCOS-CHONDROIT-MSM COMPLEX) TABS Take 1 tablet by mouth daily       No current facility-administered medications on file prior to visit. Allergies  Allergies   Allergen Reactions    Sulfa Antibiotics      Unknown reactions       Family History  Family History   Problem Relation Age of Onset    Arthritis Mother     Cancer Father         lung    Arthritis Sister     High Blood Pressure Sister        Social History  Social History     Socioeconomic History    Marital status:      Spouse name: Not on file    Number of children: Not on file    Years of education: Not on file    Highest education level: Not on file   Occupational History    Not on file   Tobacco Use    Smoking status: Current Every Day Smoker     Packs/day: 2.00     Years: 56.00     Pack years: 112.00    Smokeless tobacco: Former User     Types: Chew   Vaping Use    Vaping Use: Every day    Substances: Occasionally   Substance and Sexual Activity    Alcohol use:  Yes     Alcohol/week: 3.0 standard drinks     Types: 3 Cans of beer per week     Comment: daily    Drug use: No    Sexual activity: Not on file   Other Topics Concern    Not on file   Social History Narrative    Not on file     Social Determinants of Health     Financial Resource Strain:     Difficulty of Paying Living Expenses:    Food Insecurity:     Worried About Running Out of Food in the Last Year:     [address] in the Last Year: Transportation Needs:     Lack of Transportation (Medical):  Lack of Transportation (Non-Medical):    Physical Activity:     Days of Exercise per Week:     Minutes of Exercise per Session:    Stress:     Feeling of Stress :    Social Connections:     Frequency of Communication with Friends and Family:     Frequency of Social Gatherings with Friends and Family:     Attends [employer]ism Services:     Active Member of Clubs or Organizations:     Attends Club or Organization Meetings:     Marital Status:    Intimate Partner Violence:     Fear of Current or Ex-Partner:     Emotionally Abused:     Physically Abused:     Sexually Abused:        Health Screening Exams  Health Maintenance   Topic Date Due    Hepatitis C screen  Never done    DTaP/Tdap/Td vaccine (1 - Tdap) 12/15/1962    Shingles Vaccine (1 of 2) Never done    Low dose CT lung screening  Never done    Pneumococcal 65+ years Vaccine (1 of 1 - PPSV23) Never done   [employer] Visit (AWV)  Never done    Potassium monitoring  02/04/2021    Creatinine monitoring  02/04/2021    Flu vaccine (Season Ended) 09/01/2021    COVID-19 Vaccine  Completed    Hepatitis A vaccine  Aged Out    Hepatitis B vaccine  Aged Out    Hib vaccine  Aged Out    Meningococcal (ACWY) vaccine  Aged Out       Review of Systems  Constitutional: Negative for activity change, appetite change, chills, diaphoresis, fatigue, fever and unexpected weight change. HENT: Negative for congestion, dental problem, drooling, ear discharge, ear pain, facial swelling, hearing loss, mouth sores, nosebleeds, postnasal drip, rhinorrhea, sinus pressure, sneezing, sore throat, tinnitus, trouble swallowing and voice change. Eyes: Negative for photophobia, pain, discharge, redness, itching and visual disturbance. Respiratory: Negative for apnea, cough, choking, chest tightness, shortness of breath, wheezing and stridor.     Cardiovascular: Negative for chest pain, palpitations CARDIOVASCULAR: Heart sounds are normal.  Regular rate and rhythm without murmur, gallop or rub. Normal S1 and S2. . Carotid and femoral pulses 2+/4 and equal bilaterally. ABDOMEN: Normal shape. right groin and left groin scar(s) present. Normal bowel sounds. No bruits. Bettey Grad \"soft, nontender, nondistended, no masses or organomegaly. Left palpable tubular soft tissue structure lateral the cord structures on the left-hand side. This clearly different compared to the right. It does not reduce does not get larger with cough and feels consistent with a cord lipoma. Tenderness: Left groin when you palpate the protruding structure. RECTAL: deferred, not clinically indicated  NEUROLOGIC: There are no focalizing motor or sensory deficits. CN II-XII are grossly intact. Bettey Grad EXTREMITIES: no cyanosis, no clubbing and no edema.       .         Electronically signed by Roseanna Osman MD on 5/26/2021 at 10:50 AM

## 2021-05-26 NOTE — PROGRESS NOTES
Subjective:      Patient ID: Juan Jose Murillo is a 66 y.o. male. Chief Complaint   Patient presents with    Surgical Consult     est patient-ref by Dr. Matthew Whiting for recurrent left inguinal hernia       HPI    Review of Systems   Constitutional: Negative for activity change, appetite change, chills, diaphoresis, fatigue, fever and unexpected weight change. HENT: Negative for congestion, dental problem, drooling, ear discharge, ear pain, facial swelling, hearing loss, mouth sores, nosebleeds, postnasal drip, rhinorrhea, sinus pressure, sneezing, sore throat, tinnitus, trouble swallowing and voice change. Eyes: Negative for photophobia, pain, discharge, redness, itching and visual disturbance. Respiratory: Negative for apnea, cough, choking, chest tightness, shortness of breath, wheezing and stridor. Cardiovascular: Negative for chest pain, palpitations and leg swelling. Gastrointestinal: Positive for abdominal pain. Negative for abdominal distention, anal bleeding, blood in stool, constipation, diarrhea, nausea, rectal pain and vomiting. Genitourinary: Positive for difficulty urinating. Negative for decreased urine volume, discharge, dysuria, enuresis, flank pain, frequency, genital sores, hematuria, penile pain, penile swelling, scrotal swelling, testicular pain and urgency. Musculoskeletal: Positive for arthralgias. Negative for back pain, gait problem, joint swelling, myalgias, neck pain and neck stiffness. Skin: Negative for color change, pallor, rash and wound. Neurological: Negative for dizziness, tremors, seizures, syncope, facial asymmetry, speech difficulty, weakness, light-headedness, numbness and headaches. Hematological: Negative for adenopathy. Bruises/bleeds easily. Psychiatric/Behavioral: Negative for agitation, behavioral problems, confusion, decreased concentration, dysphoric mood, hallucinations, self-injury, sleep disturbance and suicidal ideas.  The patient is not nervous/anxious and is not hyperactive.       /72 (Site: Right Upper Arm, Position: Sitting, Cuff Size: Medium Adult)   Pulse 73   Temp 97.4 °F (36.3 °C) (Temporal)   Resp 18   Ht 5' 7\" (1.702 m)   Wt 164 lb (74.4 kg)   SpO2 98%   BMI 25.69 kg/m²     Objective:   Physical Exam    Assessment:            Plan:              Rosanna Juarez RN

## 2021-05-26 NOTE — LETTER
[facility]  Shana Nolan MD FACS  Phone- [phone]  Fax [phone]    Pt Name: Radha Alvarez  Medical Record Number: [id number]  Date of Birth 1945   Today's Date: 5/26/2021    Savana Loredo was evaluated in the office today. My assessment and plans are listed below. Assessment:     Savana Baird was seen today for surgical consult. Diagnoses and all orders for this visit:    Pre-op testing  -     Basic Metabolic Panel; Future  -     Hemoglobin and Hematocrit, Blood; Future  -     EKG 12 lead; Future    Lipoma, spermatic cord  -     IA REPAIR RECURR INGUIN LARY,REDUCIBL    Recurrent left inguinal hernia  -     IA REPAIR RECURR INGUIN LARY,REDUCIBL  -     Basic Metabolic Panel; Future  -     Hemoglobin and Hematocrit, Blood; Future  -     EKG 12 lead; Future         Plan:  1. Schedule Ed for recurrent LEFT inguinal hernia repair with mesh  2. In the office, I had a discussion with the patient regarding the risks of hernia surgery to include bleeding, infection, recurrence, injury to surrounding structures and continued pain in the area. We also discussed the use of mesh and its advantages and disadvantages. We discussed the anesthetic options, conduct of the operation, outpatient status, post op recovery and length of time off of work. After this discussion, the patient's questions were answered and has elected to proceed with surgical repair. (OPEN)  3. Status: outpatient  4. Planned anesthesia: MAC  5. He will undergo pre-operative clearance per anesthesia guidelines with risk factors listed under the past medical history diagnosis & problem list.  6. Perioperative discontinuation of ASA, Plavix, warfarin, Brillinta, Effient, Pradaxa, Eliquis and Xarelto. Continuation of 81 mg Aspirin is acceptable.   7. Perioperative medical clearance not needed   Orders Placed This Encounter:  Orders Placed This Encounter   Procedures    Basic Metabolic Panel     Standing Status: Future     Standing Expiration Date:   5/26/2022    Hemoglobin and Hematocrit, Blood     Standing Status:   Future     Standing Expiration Date:   5/26/2022    EKG 12 lead     Standing Status:   Future     Standing Expiration Date:   5/26/2022     Order Specific Question:   Reason for Exam?     Answer:   Pre-op    NV REPAIR RECURR INGUIN LARY,REDUCIBL                If I can provide any additional assistance or you have any concerns, please feel free to contact me. Thank you for allowing to participate in the care of your patients. Sincerely,      Jerrell Alcazar MD FACS  [address]  [address]  [address], [address]  Office: [phone]  Fax: [phone]

## 2021-05-28 ENCOUNTER — HOSPITAL ENCOUNTER (OUTPATIENT)
Age: 76
Discharge: HOME | End: 2021-05-28
Payer: MEDICARE

## 2021-05-28 DIAGNOSIS — Z01.818 PRE-OP TESTING: ICD-10-CM

## 2021-05-28 DIAGNOSIS — K40.91 RECURRENT LEFT INGUINAL HERNIA: ICD-10-CM

## 2021-05-28 LAB
ANION GAP SERPL CALCULATED.3IONS-SCNC: 11 MEQ/L (ref 8–16)
BUN BLDV-MCNC: 10 MG/DL (ref 7–22)
CALCIUM SERPL-MCNC: 9.4 MG/DL (ref 8.5–10.5)
CHLORIDE BLD-SCNC: 108 MEQ/L (ref 98–111)
CO2: 26 MEQ/L (ref 23–33)
CREAT SERPL-MCNC: 0.6 MG/DL (ref 0.4–1.2)
EKG ATRIAL RATE: 64 BPM
EKG P AXIS: 29 DEGREES
EKG P-R INTERVAL: 156 MS
EKG Q-T INTERVAL: 408 MS
EKG QRS DURATION: 92 MS
EKG QTC CALCULATION (BAZETT): 420 MS
EKG R AXIS: -3 DEGREES
EKG T AXIS: 45 DEGREES
EKG VENTRICULAR RATE: 64 BPM
GFR SERPL CREATININE-BSD FRML MDRD: > 90 ML/MIN/1.73M2
GLUCOSE BLD-MCNC: 102 MG/DL (ref 70–108)
HCT VFR BLD CALC: 46 % (ref 42–52)
HEMOGLOBIN: 14.6 GM/DL (ref 14–18)
POTASSIUM SERPL-SCNC: 4.8 MEQ/L (ref 3.5–5.2)
SODIUM BLD-SCNC: 145 MEQ/L (ref 135–145)

## 2021-05-28 PROCEDURE — 80048 BASIC METABOLIC PNL TOTAL CA: CPT

## 2021-05-28 PROCEDURE — 85018 HEMOGLOBIN: CPT

## 2021-05-28 PROCEDURE — 36415 COLL VENOUS BLD VENIPUNCTURE: CPT

## 2021-05-28 PROCEDURE — 85014 HEMATOCRIT: CPT

## 2021-05-28 PROCEDURE — 93005 ELECTROCARDIOGRAM TRACING: CPT

## 2021-06-04 NOTE — H&P
Ghanshyam Funez MD [facility]  General Surgery  H and P for Surgery   Pt Name: Nicolasa Patel  Date of Birth 1945   Today's Date: 6/4/2021  Medical Record Number: [id number]  Referring Provider: No ref. provider found  Primary Care Provider: Diane Alcantar MD  No chief complaint on file. ASSESSMENT      Problem List Items Addressed This Visit     None        Past Medical History:   Diagnosis Date    Arthritis     Diverticular hemorrhage     Dr. Rajan Eagle History of blood transfusion     Hypertension           PLANS      1. Schedule Ed for recurrent LEFT inguinal hernia repair with mesh  2. In the office, I had a discussion with the patient regarding the risks of hernia surgery to include bleeding, infection, recurrence, injury to surrounding structures and continued pain in the area. We also discussed the use of mesh and its advantages and disadvantages. We discussed the anesthetic options, conduct of the operation, outpatient status, post op recovery and length of time off of work. After this discussion, the patient's questions were answered and has elected to proceed with surgical repair. (OPEN)  3. Status: outpatient  4. Planned anesthesia: MAC  5. He will undergo pre-operative clearance per anesthesia guidelines with risk factors listed under the past medical history diagnosis & problem list.  6. Perioperative discontinuation of ASA, Plavix, warfarin, Brillinta, Effient, Pradaxa, Eliquis and Xarelto. Continuation of 81 mg Aspirin is acceptable. 7. Perioperative medical clearance not needed   Orders Placed This Encounter:  No orders of the defined types were placed in this encounter. Antionette Santiago is a 66 y.o. male seen in the office for evaluation of a recurrent inguinal hernia. Symptoms were first noted 3 weeks ago and has gradually worsened since that time. The pain is dull, moderate in severity.   We have seen in the renal past in 2012 where he had bilateral inguinal hernia repair with Kugel patch preperitoneal for bilateral recurrent inguinal hernias. He did not really notice any issues with his left groin until about the last 3 weeks been having pain on exam they could feel bulge or lump which did not reduce and was concern for incarceration. Has had no nausea no vomiting does has discomfort. He also describes he is having pain that radiates into his back when he sits down on his wallet he has pain into his groin and his thigh which I think is unrelated. No previsit testing has been done. Past Medical History  Past Medical History:   Diagnosis Date    Arthritis     Diverticular hemorrhage     Dr. Rajan Eagle History of blood transfusion     Hypertension        Past Surgical History  Past Surgical History:   Procedure Laterality Date    COLONOSCOPY      Dr. Gi Conley one by Dr. Judy Tolentino in 2012   [address]      Dr. Poornima Gibson       Medications  No current facility-administered medications on file prior to encounter.      Current Outpatient Medications on File Prior to Encounter   Medication Sig Dispense Refill    omeprazole (PRILOSEC) 40 MG delayed release capsule Take 40 mg by mouth daily      calcium carbonate (OSCAL) 500 MG TABS tablet Take 500 mg by mouth daily      polycarbophil (FIBERCON) 625 MG tablet Take 625 mg by mouth daily      amLODIPine (NORVASC) 10 MG tablet Take 1 tablet by mouth daily (Patient taking differently: Take 5 mg by mouth daily ) 30 tablet 3    Multiple Vitamins-Minerals (THERAPEUTIC MULTIVITAMIN-MINERALS) tablet Take 1 tablet by mouth daily      Misc Natural Products (GLUCOS-CHONDROIT-MSM COMPLEX) TABS Take 1 tablet by mouth daily       Allergies  Allergies   Allergen Reactions    Sulfa Antibiotics      Unknown reactions       Family History  Family History   Problem Relation Age of Onset    Arthritis Mother     Cancer Father         lung    Arthritis Sister     High Blood Pressure Sister PPSV23) Never done   [employer] Visit (AWV)  Never done    Flu vaccine (Season Ended) 09/01/2021    Potassium monitoring  05/28/2022    Creatinine monitoring  05/28/2022    COVID-19 Vaccine  Completed    Hepatitis A vaccine  Aged Out    Hepatitis B vaccine  Aged Out    Hib vaccine  Aged Out    Meningococcal (ACWY) vaccine  Aged Out       Review of Systems  Constitutional: Negative for activity change, appetite change, chills, diaphoresis, fatigue, fever and unexpected weight change. HENT: Negative for congestion, dental problem, drooling, ear discharge, ear pain, facial swelling, hearing loss, mouth sores, nosebleeds, postnasal drip, rhinorrhea, sinus pressure, sneezing, sore throat, tinnitus, trouble swallowing and voice change. Eyes: Negative for photophobia, pain, discharge, redness, itching and visual disturbance. Respiratory: Negative for apnea, cough, choking, chest tightness, shortness of breath, wheezing and stridor. Cardiovascular: Negative for chest pain, palpitations and leg swelling. Gastrointestinal: Positive for abdominal pain. Negative for abdominal distention, anal bleeding, blood in stool, constipation, diarrhea, nausea, rectal pain and vomiting. Genitourinary: Positive for difficulty urinating. Negative for decreased urine volume, discharge, dysuria, enuresis, flank pain, frequency, genital sores, hematuria, penile pain, penile swelling, scrotal swelling, testicular pain and urgency. Musculoskeletal: Positive for arthralgias. Negative for back pain, gait problem, joint swelling, myalgias, neck pain and neck stiffness. Skin: Negative for color change, pallor, rash and wound. Neurological: Negative for dizziness, tremors, seizures, syncope, facial asymmetry, speech difficulty, weakness, light-headedness, numbness and headaches. Hematological: Negative for adenopathy. Bruises/bleeds easily.    Psychiatric/Behavioral: Negative for agitation, behavioral problems, confusion, decreased concentration, dysphoric mood, hallucinations, self-injury, sleep disturbance and suicidal ideas. The patient is not nervous/anxious and is not hyperactive    OBJECTIVE    VITALS:  vitals were not taken for this visit. CONSTITUTIONAL: Alert and oriented times 3, no acute distress and cooperative to examination with proper mood and affect. SKIN: Skin color, texture, turgor normal. No rashes or lesions. LYMPH: no cervical nodes, no inguinal nodes  HEENT: Head is normocephalic, atraumatic. EOMI, PERRLA. NECK: Supple, symmetrical, trachea midline, no adenopathy, thyroid symmetric, not enlarged and no tenderness, skin normal.  CHEST/LUNGS: chest symmetric with normal A/P diameter, normal respiratory rate and rhythm, lungs clear to auscultation without wheezes, rales or rhonchi. No accessory muscle use. Scars None   CARDIOVASCULAR: Heart sounds are normal.  Regular rate and rhythm without murmur, gallop or rub. Normal S1 and S2. . Carotid and femoral pulses 2+/4 and equal bilaterally. ABDOMEN: Normal shape. right groin and left groin scar(s) present. Normal bowel sounds. No bruits. Kayode Couch \"soft, nontender, nondistended, no masses or organomegaly. Left palpable tubular soft tissue structure lateral the cord structures on the left-hand side. This clearly different compared to the right. It does not reduce does not get larger with cough and feels consistent with a cord lipoma. Tenderness: Left groin when you palpate the protruding structure. RECTAL: deferred, not clinically indicated  NEUROLOGIC: There are no focalizing motor or sensory deficits. CN II-XII are grossly intact. Kayode Couch EXTREMITIES: no cyanosis, no clubbing and no edema.       .         Electronically signed by Jaime Weir MD on 6/4/2021 at 6:49 AM

## 2021-06-07 ENCOUNTER — HOSPITAL ENCOUNTER (OUTPATIENT)
Age: 76
Setting detail: OUTPATIENT SURGERY
Discharge: HOSPICE | End: 2021-06-07
Attending: SURGERY | Admitting: SURGERY
Payer: MEDICARE

## 2021-06-07 ENCOUNTER — ANESTHESIA (OUTPATIENT)
Dept: OPERATING ROOM | Age: 76
End: 2021-06-07
Payer: MEDICARE

## 2021-06-07 ENCOUNTER — ANESTHESIA EVENT (OUTPATIENT)
Dept: OPERATING ROOM | Age: 76
End: 2021-06-07
Payer: MEDICARE

## 2021-06-07 VITALS
RESPIRATION RATE: 7 BRPM | DIASTOLIC BLOOD PRESSURE: 56 MMHG | OXYGEN SATURATION: 100 % | SYSTOLIC BLOOD PRESSURE: 99 MMHG

## 2021-06-07 VITALS
HEART RATE: 55 BPM | OXYGEN SATURATION: 90 % | TEMPERATURE: 96 F | DIASTOLIC BLOOD PRESSURE: 70 MMHG | RESPIRATION RATE: 16 BRPM | SYSTOLIC BLOOD PRESSURE: 149 MMHG

## 2021-06-07 DIAGNOSIS — K40.91 RECURRENT LEFT INGUINAL HERNIA: ICD-10-CM

## 2021-06-07 DIAGNOSIS — Z87.19 S/P LEFT INGUINAL HERNIA REPAIR: Primary | ICD-10-CM

## 2021-06-07 DIAGNOSIS — Z98.890 S/P LEFT INGUINAL HERNIA REPAIR: Primary | ICD-10-CM

## 2021-06-07 LAB — POTASSIUM SERPL-SCNC: 4.8 MEQ/L (ref 3.5–5.2)

## 2021-06-07 PROCEDURE — 3600000002 HC SURGERY LEVEL 2 BASE: Performed by: SURGERY

## 2021-06-07 PROCEDURE — 6360000002 HC RX W HCPCS: Performed by: NURSE ANESTHETIST, CERTIFIED REGISTERED

## 2021-06-07 PROCEDURE — 6370000000 HC RX 637 (ALT 250 FOR IP): Performed by: SURGERY

## 2021-06-07 PROCEDURE — 2580000003 HC RX 258: Performed by: SURGERY

## 2021-06-07 PROCEDURE — 7100000001 HC PACU RECOVERY - ADDTL 15 MIN: Performed by: SURGERY

## 2021-06-07 PROCEDURE — 3700000000 HC ANESTHESIA ATTENDED CARE: Performed by: SURGERY

## 2021-06-07 PROCEDURE — 2500000003 HC RX 250 WO HCPCS: Performed by: SURGERY

## 2021-06-07 PROCEDURE — 36415 COLL VENOUS BLD VENIPUNCTURE: CPT

## 2021-06-07 PROCEDURE — 6360000002 HC RX W HCPCS: Performed by: ANESTHESIOLOGY

## 2021-06-07 PROCEDURE — 84132 ASSAY OF SERUM POTASSIUM: CPT

## 2021-06-07 PROCEDURE — 7100000010 HC PHASE II RECOVERY - FIRST 15 MIN: Performed by: SURGERY

## 2021-06-07 PROCEDURE — 3700000001 HC ADD 15 MINUTES (ANESTHESIA): Performed by: SURGERY

## 2021-06-07 PROCEDURE — 7100000000 HC PACU RECOVERY - FIRST 15 MIN: Performed by: SURGERY

## 2021-06-07 PROCEDURE — 3600000012 HC SURGERY LEVEL 2 ADDTL 15MIN: Performed by: SURGERY

## 2021-06-07 PROCEDURE — 7100000011 HC PHASE II RECOVERY - ADDTL 15 MIN: Performed by: SURGERY

## 2021-06-07 PROCEDURE — 2500000003 HC RX 250 WO HCPCS: Performed by: NURSE ANESTHETIST, CERTIFIED REGISTERED

## 2021-06-07 PROCEDURE — C1781 MESH (IMPLANTABLE): HCPCS | Performed by: SURGERY

## 2021-06-07 PROCEDURE — 49521 REREPAIR ING HERNIA BLOCKED: CPT | Performed by: SURGERY

## 2021-06-07 PROCEDURE — 6360000002 HC RX W HCPCS: Performed by: SURGERY

## 2021-06-07 PROCEDURE — 2709999900 HC NON-CHARGEABLE SUPPLY: Performed by: SURGERY

## 2021-06-07 DEVICE — MESH HERN W1.8XL4IN INGUINAL POLYPR PRESHAPED SPERMATIC CRD: Type: IMPLANTABLE DEVICE | Site: GROIN | Status: FUNCTIONAL

## 2021-06-07 RX ORDER — SODIUM CHLORIDE 0.9 % (FLUSH) 0.9 %
5-40 SYRINGE (ML) INJECTION PRN
Status: DISCONTINUED | OUTPATIENT
Start: 2021-06-07 | End: 2021-06-07 | Stop reason: HOSPADM

## 2021-06-07 RX ORDER — HYDROCODONE BITARTRATE AND ACETAMINOPHEN 5; 325 MG/1; MG/1
1 TABLET ORAL EVERY 6 HOURS PRN
Qty: 20 TABLET | Refills: 0 | Status: SHIPPED | OUTPATIENT
Start: 2021-06-07 | End: 2021-06-12

## 2021-06-07 RX ORDER — MIDAZOLAM HYDROCHLORIDE 1 MG/ML
INJECTION INTRAMUSCULAR; INTRAVENOUS PRN
Status: DISCONTINUED | OUTPATIENT
Start: 2021-06-07 | End: 2021-06-07 | Stop reason: SDUPTHER

## 2021-06-07 RX ORDER — MORPHINE SULFATE 2 MG/ML
2 INJECTION, SOLUTION INTRAMUSCULAR; INTRAVENOUS EVERY 5 MIN PRN
Status: DISCONTINUED | OUTPATIENT
Start: 2021-06-07 | End: 2021-06-07 | Stop reason: HOSPADM

## 2021-06-07 RX ORDER — SODIUM CHLORIDE 0.9 % (FLUSH) 0.9 %
5-40 SYRINGE (ML) INJECTION EVERY 12 HOURS SCHEDULED
Status: DISCONTINUED | OUTPATIENT
Start: 2021-06-07 | End: 2021-06-07 | Stop reason: HOSPADM

## 2021-06-07 RX ORDER — IBUPROFEN 400 MG/1
400 TABLET ORAL ONCE
Status: COMPLETED | OUTPATIENT
Start: 2021-06-07 | End: 2021-06-07

## 2021-06-07 RX ORDER — SODIUM CHLORIDE 9 MG/ML
INJECTION, SOLUTION INTRAVENOUS CONTINUOUS
Status: DISCONTINUED | OUTPATIENT
Start: 2021-06-07 | End: 2021-06-07 | Stop reason: HOSPADM

## 2021-06-07 RX ORDER — SODIUM CHLORIDE 9 MG/ML
25 INJECTION, SOLUTION INTRAVENOUS PRN
Status: DISCONTINUED | OUTPATIENT
Start: 2021-06-07 | End: 2021-06-07 | Stop reason: HOSPADM

## 2021-06-07 RX ORDER — LIDOCAINE HCL/PF 100 MG/5ML
SYRINGE (ML) INJECTION PRN
Status: DISCONTINUED | OUTPATIENT
Start: 2021-06-07 | End: 2021-06-07 | Stop reason: SDUPTHER

## 2021-06-07 RX ORDER — HYDRALAZINE HYDROCHLORIDE 20 MG/ML
5 INJECTION INTRAMUSCULAR; INTRAVENOUS EVERY 10 MIN PRN
Status: DISCONTINUED | OUTPATIENT
Start: 2021-06-07 | End: 2021-06-07 | Stop reason: HOSPADM

## 2021-06-07 RX ORDER — FENTANYL CITRATE 50 UG/ML
50 INJECTION, SOLUTION INTRAMUSCULAR; INTRAVENOUS EVERY 5 MIN PRN
Status: DISCONTINUED | OUTPATIENT
Start: 2021-06-07 | End: 2021-06-07 | Stop reason: HOSPADM

## 2021-06-07 RX ORDER — ACETAMINOPHEN 500 MG
1000 TABLET ORAL ONCE
Status: COMPLETED | OUTPATIENT
Start: 2021-06-07 | End: 2021-06-07

## 2021-06-07 RX ORDER — MEPERIDINE HYDROCHLORIDE 25 MG/ML
12.5 INJECTION INTRAMUSCULAR; INTRAVENOUS; SUBCUTANEOUS EVERY 5 MIN PRN
Status: DISCONTINUED | OUTPATIENT
Start: 2021-06-07 | End: 2021-06-07 | Stop reason: HOSPADM

## 2021-06-07 RX ORDER — ONDANSETRON 2 MG/ML
4 INJECTION INTRAMUSCULAR; INTRAVENOUS
Status: DISCONTINUED | OUTPATIENT
Start: 2021-06-07 | End: 2021-06-07 | Stop reason: HOSPADM

## 2021-06-07 RX ORDER — FENTANYL CITRATE 50 UG/ML
INJECTION, SOLUTION INTRAMUSCULAR; INTRAVENOUS PRN
Status: DISCONTINUED | OUTPATIENT
Start: 2021-06-07 | End: 2021-06-07 | Stop reason: SDUPTHER

## 2021-06-07 RX ORDER — DIPHENHYDRAMINE HYDROCHLORIDE 50 MG/ML
12.5 INJECTION INTRAMUSCULAR; INTRAVENOUS
Status: DISCONTINUED | OUTPATIENT
Start: 2021-06-07 | End: 2021-06-07 | Stop reason: HOSPADM

## 2021-06-07 RX ORDER — LABETALOL 20 MG/4 ML (5 MG/ML) INTRAVENOUS SYRINGE
5 4 TIMES DAILY PRN
Status: DISCONTINUED | OUTPATIENT
Start: 2021-06-07 | End: 2021-06-07 | Stop reason: HOSPADM

## 2021-06-07 RX ORDER — PROPOFOL 10 MG/ML
INJECTION, EMULSION INTRAVENOUS PRN
Status: DISCONTINUED | OUTPATIENT
Start: 2021-06-07 | End: 2021-06-07 | Stop reason: SDUPTHER

## 2021-06-07 RX ORDER — HYDROCODONE BITARTRATE AND ACETAMINOPHEN 5; 325 MG/1; MG/1
1 TABLET ORAL EVERY 6 HOURS PRN
Status: DISCONTINUED | OUTPATIENT
Start: 2021-06-07 | End: 2021-06-07 | Stop reason: HOSPADM

## 2021-06-07 RX ADMIN — ACETAMINOPHEN 1000 MG: 500 TABLET ORAL at 08:40

## 2021-06-07 RX ADMIN — PROPOFOL 40 MG: 10 INJECTION, EMULSION INTRAVENOUS at 11:04

## 2021-06-07 RX ADMIN — MIDAZOLAM 2 MG: 1 INJECTION INTRAMUSCULAR; INTRAVENOUS at 10:33

## 2021-06-07 RX ADMIN — SODIUM CHLORIDE: 9 INJECTION, SOLUTION INTRAVENOUS at 08:40

## 2021-06-07 RX ADMIN — PROPOFOL 80 MG: 10 INJECTION, EMULSION INTRAVENOUS at 10:36

## 2021-06-07 RX ADMIN — PROPOFOL 40 MG: 10 INJECTION, EMULSION INTRAVENOUS at 10:54

## 2021-06-07 RX ADMIN — PROPOFOL 40 MG: 10 INJECTION, EMULSION INTRAVENOUS at 10:44

## 2021-06-07 RX ADMIN — CEFAZOLIN 2000 MG: 10 INJECTION, POWDER, FOR SOLUTION INTRAVENOUS at 10:29

## 2021-06-07 RX ADMIN — FENTANYL CITRATE 50 MCG: 50 INJECTION, SOLUTION INTRAMUSCULAR; INTRAVENOUS at 11:35

## 2021-06-07 RX ADMIN — FENTANYL CITRATE 50 MCG: 50 INJECTION, SOLUTION INTRAMUSCULAR; INTRAVENOUS at 10:39

## 2021-06-07 RX ADMIN — Medication 100 MG: at 10:36

## 2021-06-07 RX ADMIN — IBUPROFEN 400 MG: 400 TABLET, FILM COATED ORAL at 08:40

## 2021-06-07 RX ADMIN — FENTANYL CITRATE 50 MCG: 50 INJECTION, SOLUTION INTRAMUSCULAR; INTRAVENOUS at 10:50

## 2021-06-07 ASSESSMENT — PAIN - FUNCTIONAL ASSESSMENT: PAIN_FUNCTIONAL_ASSESSMENT: 0-10

## 2021-06-07 ASSESSMENT — PAIN DESCRIPTION - FREQUENCY: FREQUENCY: CONTINUOUS

## 2021-06-07 ASSESSMENT — PULMONARY FUNCTION TESTS
PIF_VALUE: 1
PIF_VALUE: 1
PIF_VALUE: 2
PIF_VALUE: 1
PIF_VALUE: 2
PIF_VALUE: 1
PIF_VALUE: 2
PIF_VALUE: 1
PIF_VALUE: 2
PIF_VALUE: 1
PIF_VALUE: 2
PIF_VALUE: 1
PIF_VALUE: 2
PIF_VALUE: 0
PIF_VALUE: 2
PIF_VALUE: 1
PIF_VALUE: 2
PIF_VALUE: 1
PIF_VALUE: 1
PIF_VALUE: 2
PIF_VALUE: 3
PIF_VALUE: 2

## 2021-06-07 ASSESSMENT — PAIN DESCRIPTION - DESCRIPTORS
DESCRIPTORS: ACHING
DESCRIPTORS: ACHING

## 2021-06-07 ASSESSMENT — PAIN SCALES - GENERAL
PAINLEVEL_OUTOF10: 1
PAINLEVEL_OUTOF10: 1
PAINLEVEL_OUTOF10: 3
PAINLEVEL_OUTOF10: 2
PAINLEVEL_OUTOF10: 0

## 2021-06-07 ASSESSMENT — PAIN DESCRIPTION - ORIENTATION: ORIENTATION: ANTERIOR

## 2021-06-07 ASSESSMENT — PAIN DESCRIPTION - LOCATION: LOCATION: ABDOMEN

## 2021-06-07 ASSESSMENT — PAIN DESCRIPTION - PAIN TYPE: TYPE: SURGICAL PAIN

## 2021-06-07 ASSESSMENT — PAIN DESCRIPTION - PROGRESSION: CLINICAL_PROGRESSION: GRADUALLY IMPROVING

## 2021-06-07 NOTE — PROGRESS NOTES
Transported to [facility] in stable condition. Denies c/o nausea, pain. A/O x 4.

## 2021-06-07 NOTE — H&P
[facility]  History and Physical Update    Pt Name: Diane Harmon  MRN: [id number]  YOB: 1945  Date of evaluation: 6/7/2021    [x] I have examined the patient and reviewed the H&P/Consult and there are no changes to the patient or plans.     [] I have examined the patient and reviewed the H&P/Consult and have noted the following changes:        Val Rodriguez MD  Electronically signed 6/7/2021 at 10:13 AM

## 2021-06-07 NOTE — ANESTHESIA PRE PROCEDURE
Department of Anesthesiology  Preprocedure Note       Name:  Ragini Vera   Age:  66 y.o.  :  1945                                          MRN:  [id number]         Date:  2021      Surgeon: Camille Arthur):  Xin Singleton MD    Procedure: Procedure(s):  OPEN REPAIR OF RECURRENT LEF TINGUINAL HERNIA WITH MESH    Medications prior to admission:   Prior to Admission medications    Medication Sig Start Date End Date Taking? Authorizing Provider   omeprazole (PRILOSEC) 40 MG delayed release capsule Take 40 mg by mouth daily   Yes Historical Provider, MD   calcium carbonate (OSCAL) 500 MG TABS tablet Take 500 mg by mouth daily   Yes Historical Provider, MD   polycarbophil (FIBERCON) 625 MG tablet Take 625 mg by mouth daily   Yes Historical Provider, MD   amLODIPine (NORVASC) 10 MG tablet Take 1 tablet by mouth daily  Patient taking differently: Take 5 mg by mouth daily  16  Yes Jade Cote MD   Multiple Vitamins-Minerals (THERAPEUTIC MULTIVITAMIN-MINERALS) tablet Take 1 tablet by mouth daily   Yes Historical Provider, MD   Misc Natural Products (GLUCOS-CHONDROIT-MSM COMPLEX) TABS Take 1 tablet by mouth daily   Yes Historical Provider, MD       Current medications:    Current Facility-Administered Medications   Medication Dose Route Frequency Provider Last Rate Last Admin    0.9 % sodium chloride infusion   Intravenous Continuous Xin Singleton  mL/hr at 21 0840 New Bag at 21 0840    sodium chloride flush 0.9 % injection 5-40 mL  5-40 mL Intravenous 2 times per day Xin Singleton MD        sodium chloride flush 0.9 % injection 5-40 mL  5-40 mL Intravenous PRN Xin Singleton MD        0.9 % sodium chloride infusion  25 mL Intravenous PRN Xin Singleton MD        ceFAZolin (ANCEF) 2000 mg in dextrose 5 % 50 mL IVPB  2,000 mg Intravenous On Call to MD Ralph           Allergies:     Allergies   Allergen Reactions    Sulfa Antibiotics      Unknown reactions       Problem List:    Patient Active Problem List   Diagnosis Code    Hernia, inguinal, bilateral, recurrent K40.21    Lower GI bleed K92.2    Current smoker F17.200    Hematochezia K92.1    Acute blood loss anemia D62    Alcohol abuse F10.10    Primary osteoarthritis involving multiple joints M89.49    Diverticulosis of large intestine with hemorrhage K57.31    Internal hemorrhoid K64.8    Colon polyps K63.5    GIB (gastrointestinal bleeding) K92.2    Essential hypertension I10    BRBPR (bright red blood per rectum) K62.5    Diverticulitis of colon with bleeding K57.33    Diverticulosis K57.90    Lipoma, 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 Arben Garnica History of blood transfusion     Hypertension        Past Surgical History:        Procedure Laterality Date    COLONOSCOPY      Dr. Zaid Myers one by Dr. Alvarado Dale in 2012   [facility] Valdez       Social History:    Social History     Tobacco Use    Smoking status: Current Every Day Smoker     Packs/day: 2.00     Years: 56.00     Pack years: 112.00    Smokeless tobacco: Former User     Types: Chew   Substance Use Topics    Alcohol use:  Yes     Alcohol/week: 3.0 standard drinks     Types: 3 Cans of beer per week     Comment: daily                                Ready to quit: Not Answered  Counseling given: Not Answered      Vital Signs (Current):   Vitals:    06/07/21 0803   BP: (!) 164/87   Pulse: 76   Resp: 16   Temp: 96.7 °F (35.9 °C)   TempSrc: Temporal   SpO2: 97%                                              BP Readings from Last 3 Encounters:   06/07/21 (!) 164/87   05/26/21 128/72   02/04/20 (!) 143/68       NPO Status: Time of last liquid consumption: 0844                        Time of last solid consumption: 1900

## 2021-06-07 NOTE — PROGRESS NOTES
Pt has met discharge criteria and states he is ready for discharge to home. IV removed, gauze and tape applied. Dressed in own clothes and personal belongings gathered. Discharge instructions (with opioid medication education information) given to pt and family; pt and family verbalized understanding of discharge instructions, prescriptions and follow up appointments. Pt transported to discharge lobby by [facility] staff.

## 2021-06-07 NOTE — PROGRESS NOTES
Pt returned to [facility] room 5. Vitals and assessment as charted. 0.9 infusing, IV patent. Pt has crackers and daria mist. Family at the bedside. Pt and family verbalized understanding of discharge criteria and call light use. Call light in reach.

## 2021-06-07 NOTE — PROGRESS NOTES
Pt to the OR with no dentures, jewelry or clothing. Glasses and hearing aids left in SDS room with .

## 2021-06-07 NOTE — OP NOTE
[address] [address]  Operative Report    PATIENT NAME: Donald Garcia RECORD NO. 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 DRAINS: none  DISPOSITION: sds  CONDITION: stable      Narrative:    Patient brought the operating room left-sided been previously marked sedation Anesthesia timeout was taken left lower quadrant clipped prepped draped sterilely ChloraPrep 3 minutes allowed to pass draped sterilely after adequate sedation his previous angle left inguinal scar was an infiltrated with a mixture of absent Marcaine was not lidocaine equal parts the incision was incised subtenons tissue was divided getting down the external beak fascia it was scarred from her previous anterior approach by surgeon 40 years ago. The external bleak fascia was then cleared and then we dissected laterally getting down to the inguinal ligament working her way down towards the pubic tubercle. As I worked up laterally I found a 4 cm related mass fatty in nature dissected around it and was coming through a defect in the external oblique fascia near the back bottom side of the inguinal ligament so I probed the area and there definitely was a defect there that this was coming through and this was the palpable mass noted preoperatively.   I did open up the external Bleich fascia cleared off scar freed up the cord structures and then dissected down to his previous anterior repair and it appeared that when they fixed his previous repair they did not go down to the shelving portion of the inguinal ligament but sewed it to the external beak fascia high and that this defect was posterior to this. So a couple of sutures were taken down to take down this previous anterior repair is Kugel preperitoneal repair mesh to be palpated was in place. And dissecting up there was a defect that was just at the shelving portion in the ligament between that and the prior hernia repair with a sewed anterior to this. This is the location that this cord lipoma was protruding through so this was dissected off and the cord is high as possible and transected and then since he had a preperitoneal repair which was intact I elected to take a 4 cm x 2 cm rectangular piece of mesh lay at in location over this defect and is then was secured with a running 0 Prolene along the actual shelving portion in the ligament and then anteriorly it was run with another 0 Prolene securing it to the edge where the previous hernia he wrote was closed. This effectively blocked off this area closed off this defect.   The cord structures were returned to their location hemostasis was adequate the external oblique was closed with a 2-0 silk Tamara's with 3-0 plain and skin was closed with 4-0 Vicryl subcuticular stitch Dermabond glue was applied the patient brought back to outpatient stable condition

## 2021-06-08 ENCOUNTER — TELEPHONE (OUTPATIENT)
Dept: SURGERY | Age: 76
End: 2021-06-08

## 2021-06-21 ENCOUNTER — OFFICE VISIT (OUTPATIENT)
Dept: SURGERY | Age: 76
End: 2021-06-21

## 2021-06-21 VITALS
HEIGHT: 67 IN | RESPIRATION RATE: 18 BRPM | HEART RATE: 67 BPM | TEMPERATURE: 97.1 F | WEIGHT: 164 LBS | DIASTOLIC BLOOD PRESSURE: 64 MMHG | SYSTOLIC BLOOD PRESSURE: 122 MMHG | BODY MASS INDEX: 25.74 KG/M2 | OXYGEN SATURATION: 95 %

## 2021-06-21 DIAGNOSIS — Z98.890 S/P LEFT INGUINAL HERNIA REPAIR: Primary | ICD-10-CM

## 2021-06-21 DIAGNOSIS — Z87.19 S/P LEFT INGUINAL HERNIA REPAIR: Primary | ICD-10-CM

## 2021-06-21 PROCEDURE — 99024 POSTOP FOLLOW-UP VISIT: CPT | Performed by: SURGERY

## 2021-06-21 NOTE — LETTER
[facility]  Joan Perdue MD FACS  Phone- [phone]  Fax [phone]    Pt Name: Akira Gonzalez  Medical Record Number: [id number]  Date of Birth 1945   Today's Date: 6/21/2021    Ed Chand was evaluated in the office today. My assessment and plans are listed below. Assessment:     Sarina Castillo was seen today for post-op check. Diagnoses and all orders for this visit:    S/P left inguinal hernia repair         Plan:  Pathology reviewed with the patient who understands. All questions were answered. New Prescriptions    No medications on file     Patient Instructions   May return to full activity without restrictions. Follow up: Return if symptoms worsen or fail to improve. If I can provide any additional assistance or you have any concerns, please feel free to contact me. Thank you for allowing to participate in the care of your patients. Sincerely,      Joan Perdue MD FACS  [address]  [place], [facility]  Office: [phone]  Fax: [phone]

## 2021-12-16 ENCOUNTER — TELEPHONE (OUTPATIENT)
Dept: AUDIOLOGY | Age: 76
End: 2021-12-16

## 2021-12-16 NOTE — TELEPHONE ENCOUNTER
Patient stopped in stating that his RIGHT hearing aid was turning on, but had no sound. The hearing aid was given to Naren Pereira to be checked. The hearing aid is in warranty.

## 2021-12-21 NOTE — TELEPHONE ENCOUNTER
Walk-in/Drop-off. Right hearing aid weak. Cleaned, vac'd significant amount of debris from mics but aid still weak. [place] tones audible but no kris pickup. Sent to [facility] for in warranty repair.

## 2022-08-18 ENCOUNTER — TELEPHONE (OUTPATIENT)
Dept: AUDIOLOGY | Age: 77
End: 2022-08-18

## 2022-08-18 ENCOUNTER — HOSPITAL ENCOUNTER (OUTPATIENT)
Dept: AUDIOLOGY | Age: 77
Discharge: HOME | End: 2022-08-18

## 2022-08-18 PROCEDURE — V5267 HEARING AID SUP/ACCESS/DEV: HCPCS | Performed by: AUDIOLOGIST

## 2022-08-18 NOTE — TELEPHONE ENCOUNTER
Patient walked in with a hearing aid problem. He states that he could hear the hearing aid turn on , but he could not hear any sound. The hearing aid was given to [facility] to be checked. Both hearing aids are still under warranty. Patient states that while he is here to have the RIGHT hearing aid checked also. The patient also purchased 3 packs of wax filters. Receipt given to patient.

## 2022-08-18 NOTE — PROGRESS NOTES
ACCOUNT #: [de-identified]    DIAGNOSIS: Sensorineural hearing loss of both ears. HEARING AID PROBLEM (walk in): Left hearing aid not functioning- hears start up tone but no amplification (confirmed with listening check). Right STACK produces static per patient report (unable to hear any static with listening check). Replaced microphone covers on both hearing aids and wax filter on right HA. Vacuumed slight debris from right . Purchased 24 wax filters- billed $5.00. Patient encouraged to return/drop off right HA to be sent in if problem with static continues.

## 2022-10-03 ENCOUNTER — HOSPITAL ENCOUNTER (OUTPATIENT)
Dept: AUDIOLOGY | Age: 77
Discharge: HOME | End: 2022-10-03

## 2022-10-03 PROCEDURE — V5267 HEARING AID SUP/ACCESS/DEV: HCPCS | Performed by: AUDIOLOGIST

## 2022-12-22 ENCOUNTER — HOSPITAL ENCOUNTER (OUTPATIENT)
Dept: AUDIOLOGY | Age: 77
Discharge: HOME | End: 2022-12-22

## 2022-12-22 PROCEDURE — V5267 HEARING AID SUP/ACCESS/DEV: HCPCS | Performed by: AUDIOLOGIST

## 2023-10-03 ENCOUNTER — HOSPITAL ENCOUNTER (OUTPATIENT)
Dept: AUDIOLOGY | Age: 78
Discharge: HOME | End: 2023-10-03

## 2023-10-03 PROCEDURE — V5267 HEARING AID SUP/ACCESS/DEV: HCPCS | Performed by: AUDIOLOGIST

## 2023-10-03 PROCEDURE — 9990000010 HC NO CHARGE VISIT: Performed by: AUDIOLOGIST

## 2023-10-03 NOTE — PROGRESS NOTES
ACCOUNT #: [de-identified]    DIAGNOSIS: H90.3    HEARING AID PROBLEM: Patient states right hearing aid is weak. Cleaned both hearing aids. Debris vacuumed from both receivers. Vacuumed mics and cleaned vents. Replaced kris covers and  filters. Listening check revealed good kris sensitivity and output from the right hearing aid. The left hearing aid initially had weak output with static. After several minutes the output returned to normal. Recommended  repair for the left hearing aid. Reminded the patient that his warranty is in effect until 05/04/2024. Suggested that the right hearing aid be sent in before the warranty expires due to debris noted in the kris. Patient agreed to send in the left hearing aid at this time and will send in the right hearing aid at a later date. Otoscopy was clear for the left ear and revealed dry partially occluding cerumen in the right ear. Recommended patient use Debrox and flush his ear.  He purchased 24 filters and paid $10.

## 2023-10-09 ENCOUNTER — TELEPHONE (OUTPATIENT)
Dept: AUDIOLOGY | Age: 78
End: 2023-10-09

## 2023-10-10 NOTE — TELEPHONE ENCOUNTER
Patient picked up his repaired left hearing aid and dropped off his right [facility] hearing aid to be sent in for repair. Patient to be called when his hearing aid is back from repair.

## 2023-10-19 ENCOUNTER — TELEPHONE (OUTPATIENT)
Dept: AUDIOLOGY | Age: 78
End: 2023-10-19

## 2023-10-19 NOTE — TELEPHONE ENCOUNTER
Patient's right  [facility] hearing aid is back from repair. He needs a 30 min appt to program both hearing aids together.  N/C under warranty

## 2023-10-20 ENCOUNTER — HOSPITAL ENCOUNTER (OUTPATIENT)
Dept: AUDIOLOGY | Age: 78
Discharge: HOME | End: 2023-10-20

## 2023-10-20 PROCEDURE — 9990000010 HC NO CHARGE VISIT: Performed by: AUDIOLOGIST

## 2023-10-20 NOTE — PROGRESS NOTES
Dispensed repaired right hearing aid. Users settings were programmed back into the hearing aid. Both hearing aids were connected to establish wireless HA pairing. Patient aware of  warranty expiration date 05/04/2024.

## 2024-01-05 NOTE — DISCHARGE SUMMARY
Hospital Medicine Discharge Summary      Patient Identification:   Maricarmen Figueroa   : 1945  MRN: [id number]   Account: [de-identified]      Patient's PCP: Martínez Tobar MD    Admit Date: 2018     Discharge Date: 9/15/2018      Admitting Physician: Cristian Quigley MD     Discharge Physician: Sherlyn Antonio MD     Discharge Diagnoses: Active Hospital Problems    Diagnosis Date Noted    Essential hypertension [I10]     GIB (gastrointestinal bleeding) [K92.2] 2018       The patient was seen and examined on day of discharge and this discharge summary is in conjunction with any daily progress note from day of discharge. Hospital Course:   Maricarmen Figueroa is a 74 y.o. male admitted to [address] [place] [address] on 2018 for rectal bleeding. The pt has a known hx of diverticulosis; he came to ER with rectal bleeding and was admitted. Eugene Garcia His h/h remained stable. He was seen by GI and it was elected not to perform endoscopy due to the likelihood the bleeding was diverticular. The bleeding subsided spontaneously and he was started on a diet and is being discharged. This is his second diverticular bleed. He will follow up with GI and his pcp. .    He had been taking a NSAID prior to admission and was advised to avoid them. Exam:     Vitals:  Vitals:    18 2045 18 2125 09/15/18 0434 09/15/18 0819   BP: (!) 171/76 (!) 142/71 126/79 (!) 148/90   Pulse: 75  72 75   Resp:    Temp: 98 °F (36.7 °C)  98 °F (36.7 °C)    TempSrc: Oral  Oral    SpO2: 97%  96%    Weight:   162 lb 8 oz (73.7 kg)    Height:         Weight: Weight: 162 lb 8 oz (73.7 kg)     24 hour intake/output:  Intake/Output Summary (Last 24 hours) at 09/15/18 1228  Last data filed at 09/15/18 0434   Gross per 24 hour   Intake              210 ml   Output                0 ml   Net              210 ml         General appearance:  No apparent distress, appears stated age and cooperative.   HEENT: Normal cephalic, atraumatic without obvious deformity. Pupils equal, round, and reactive to light. Extra ocular muscles intact. Conjunctivae/corneas clear. Neck: Supple, with full range of motion. No jugular venous distention. Trachea midline. Respiratory:  Normal respiratory effort. Clear to auscultation, bilaterally without Rales/Wheezes/Rhonchi. Cardiovascular:  Regular rate and rhythm with normal S1/S2 without murmurs, rubs or gallops. Abdomen: Soft, non-tender, non-distended with normal bowel sounds. Musculoskeletal:  No clubbing, cyanosis or edema bilaterally. Full range of motion without deformity. Skin: Skin color, texture, turgor normal.  No rashes or lesions. Neurologic:  Neurovascularly intact without any focal sensory/motor deficits. Cranial nerves: II-XII intact, grossly non-focal.  Psychiatric:  Alert and oriented, thought content appropriate, normal insight  Capillary Refill: Brisk,< 3 seconds   Peripheral Pulses: +2 palpable, equal bilaterally       Labs: For convenience and continuity at follow-up the following most recent labs are provided:      CBC:    Lab Results   Component Value Date    WBC 4.4 09/13/2018    HGB 10.6 09/15/2018    HCT 32.2 09/15/2018     09/13/2018       Renal:  Lab Results   Component Value Date     09/13/2018    K 4.5 09/13/2018     09/13/2018    CO2 23 09/13/2018    BUN 15 09/13/2018    CREATININE 0.7 09/13/2018    CALCIUM 7.9 09/13/2018         Significant Diagnostic Studies    Radiology:   NM GI BLOOD LOSS   Final Result      Abnormal radiotracer accumulation in the transverse colon at the hepatic flexure consistent with active gastrointestinal bleeding. Final report electronically signed by Dr. Fifi Magaña on 9/13/2018 9:57 AM      CT ABDOMEN PELVIS W IV CONTRAST Additional Contrast? None   Final Result      1. Moderate-sized hiatal hernia. 2. Distended gallbladder with possible gallbladder fold or stone in the gallbladder neck. Correlation with serology and ultrasound as clinically indicated. 3. Moderate scattered stool throughout the colon multiple colonic diverticuli greatest sigmoid colon. There is no wall thickening to suggest diverticulitis. 4. Advanced degenerative changes lumbar spine please see above additional comments         **This report has been created using voice recognition software. It may contain minor errors which are inherent in voice recognition technology. **      Final report electronically signed by Dr. Emilia Mejia on 9/12/2018 5:18 PM             Consults:     IP CONSULT TO GI    Disposition:    [x] Home       [] TCU       [] Rehab       [] Psych       [] SNF       [] [facility]       [] Other-    Condition at Discharge: Stable    Code Status:  Prior     Patient Instructions:    Discharge lab work: Activity: activity as tolerated  Diet:        Follow-up visits:   Danyel Spears MD  [address]  [address] [place], [place] [address]  [phone]      out of office for 3 weeks    Baudilio Rutledge MD  [address]   [address] [facility]  [phone]    Go on 9/20/2018  bring medications, photo ID, & insurance card, please arrive 15 min prior to appointment time, your appointment time is at 9:20 AM     Aracelis Desai, APRN - CNP  [address] [facility]    Schedule an appointment as soon as possible for a visit in 2 weeks  Follow up for bleeding.  Make appointment for a couple weeks         Discharge Medications:      Anjel Esposito   Home Medication Instructions EDU:[id number]    Printed on:09/15/18 1228   Medication Information                      acetaminophen (TYLENOL) 500 MG tablet  Take 1,000 mg by mouth every 6 hours as needed for Pain             amLODIPine (NORVASC) 10 MG tablet  Take 1 tablet by mouth daily             ferrous sulfate 325 (65 FE) MG tablet  Take 1 tablet by mouth 2 times daily (with meals)             Misc 05-Jan-2024 10:44

## 2024-09-09 ENCOUNTER — HOSPITAL ENCOUNTER (OUTPATIENT)
Dept: AUDIOLOGY | Age: 79
Discharge: HOME | End: 2024-09-09
Payer: MEDICARE

## 2024-09-09 PROCEDURE — 92557 COMPREHENSIVE HEARING TEST: CPT | Performed by: AUDIOLOGIST

## 2024-09-09 PROCEDURE — 9990000010 HC NO CHARGE VISIT: Performed by: AUDIOLOGIST

## 2024-09-10 ENCOUNTER — TELEPHONE (OUTPATIENT)
Dept: AUDIOLOGY | Age: 79
End: 2024-09-10

## 2024-10-01 ENCOUNTER — HOSPITAL ENCOUNTER (OUTPATIENT)
Dept: AUDIOLOGY | Age: 79
Discharge: HOME | End: 2024-10-01

## 2024-10-01 PROCEDURE — 4700000061 HC EXTENDED WARRANTY (NON-PMM): Performed by: AUDIOLOGIST

## 2024-10-01 PROCEDURE — V5242 HEARING AID, MONAURAL, CIC: HCPCS | Performed by: AUDIOLOGIST

## 2024-10-01 PROCEDURE — V5241 DISPENSING FEE, MONAURAL: HCPCS | Performed by: AUDIOLOGIST

## 2024-10-01 NOTE — PROGRESS NOTES
[facility]#: [id number]   [facility]#: [id number]  PAYOR: Self  PRIOR AUTH #: N/A  Number of visits allowed: Unlimited visits while in warranty (warranty ends 10/15/2027- right, 09/10/2025- extended warranty- left).  Charge for follow up visits: Follow up visits (out of warranty) are subject to current office visit charge.      DIAGNOSIS: H90.3            NEW HEARING AID FITTING: A [employer] i2000 CIC hearing aid was fit and dispensed for the right ear. Explained care, use and insertion/removal.  Programmed.  Hearing aid fitting recheck scheduled for 10/24/2024.      SPEECH MAPPING:        The [employer] real ear system was used to perform verification of Ed's hearing aid fitting. The output of Ed's [employer] CIC amplification for both ears was programmed to match appropriate NAL-NL2 targets for MPO, soft, medium and loud stimuli utilizing speech mapping based on his 09/09/2024 audiogram.    Speech mapping results suggest: good target match with improved speech audibility in both ears.

## 2024-10-24 ENCOUNTER — HOSPITAL ENCOUNTER (OUTPATIENT)
Dept: AUDIOLOGY | Age: 79
Discharge: HOME | End: 2024-10-24

## 2024-10-24 PROCEDURE — 9990000010 HC NO CHARGE VISIT: Performed by: AUDIOLOGIST

## 2024-10-24 NOTE — PROGRESS NOTES
TWO WEEK CHECK: The patient is not reporting any issues with his new right hearing aid. He would like the overall gain increased in his left hearing aid. He also is not using his program button. He would like to go back to having a volume control.  His volume control was activated. A vent plug was also installed in his right hearing aid to allow his to access additional gain without feedback. Dispensed a vent reamer per patient request. Scheduled annual hearing aid check for 10/02/2025. Will see patient sooner if any problems arise.

## 2025-06-20 ENCOUNTER — TELEPHONE (OUTPATIENT)
Dept: AUDIOLOGY | Age: 80
End: 2025-06-20

## 2025-06-20 NOTE — TELEPHONE ENCOUNTER
Patient dropped off his damaged LEFT hearing aid with cracked casing.  It is under warranty.     Can you please send in and get a quote on how much it will be to fix it?      Also, the hearing aid is from 2021,will you need another impression done?    Patient to be contacted with quote and he is aware he may need another impression.

## 2025-06-24 NOTE — TELEPHONE ENCOUNTER
Sent in left hearing aid. Asked to be contacted with repair $ estimate. I will call patient to approve the repair. HA is under warranty for a repair but not remake.

## 2025-06-30 NOTE — TELEPHONE ENCOUNTER
Patient's hearing aid came back from repair.  He picked it up and said it sounds good.  I told him to call if he has any problems.

## (undated) DEVICE — APPLICATOR PREP 26ML 0.7% IOD POVACRYLEX 74% ISO ALC ST

## (undated) DEVICE — BREAST HERNIA PACK: Brand: MEDLINE INDUSTRIES, INC.

## (undated) DEVICE — SUTURE VCRL + SZ 4-0 L27IN ABSRB WHT FS-2 3/8 CIR REV CUT VCP422H

## (undated) DEVICE — SUTURE PLN GUT SZ 3-0 L27IN ABSRB YELLOWISH TAN L36MM CT-1 842H

## (undated) DEVICE — GLOVE ORANGE PI 7 1/2   MSG9075

## (undated) DEVICE — SUTURE VCRL SZ 2-0 L27IN ABSRB UD L26MM SH 1/2 CIR J417H

## (undated) DEVICE — SUTURE PERMA-HAND SZ 2-0 L30IN NONABSORBABLE BLK L26MM SH K833H

## (undated) DEVICE — GLOVE ORANGE PI 8   MSG9080

## (undated) DEVICE — ADHESIVE SKIN CLSR 0.7ML TOP DERMBND ADV

## (undated) DEVICE — PENCIL SMK EVAC 15FT BLADE ELECTRD ROCKER F/TELSCP